# Patient Record
Sex: MALE | Race: WHITE | Employment: FULL TIME | URBAN - METROPOLITAN AREA
[De-identification: names, ages, dates, MRNs, and addresses within clinical notes are randomized per-mention and may not be internally consistent; named-entity substitution may affect disease eponyms.]

---

## 2017-02-16 ENCOUNTER — APPOINTMENT (OUTPATIENT)
Dept: LAB | Age: 42
End: 2017-02-16
Attending: FAMILY MEDICINE
Payer: COMMERCIAL

## 2017-02-16 DIAGNOSIS — E78.00 HIGH CHOLESTEROL: ICD-10-CM

## 2017-02-16 DIAGNOSIS — IMO0001 TYPE II OR UNSPECIFIED TYPE DIABETES MELLITUS WITHOUT MENTION OF COMPLICATION, UNCONTROLLED: ICD-10-CM

## 2017-02-16 LAB
ALBUMIN SERPL-MCNC: 3.8 G/DL (ref 3.5–4.8)
ALP LIVER SERPL-CCNC: 115 U/L (ref 45–117)
ALT SERPL-CCNC: 51 U/L (ref 17–63)
AST SERPL-CCNC: 21 U/L (ref 15–41)
BILIRUB SERPL-MCNC: 1.4 MG/DL (ref 0.1–2)
BUN BLD-MCNC: 11 MG/DL (ref 8–20)
CALCIUM BLD-MCNC: 8.8 MG/DL (ref 8.3–10.3)
CHLORIDE: 103 MMOL/L (ref 101–111)
CHOLEST SMN-MCNC: 187 MG/DL (ref ?–200)
CO2: 26 MMOL/L (ref 22–32)
CREAT BLD-MCNC: 0.97 MG/DL (ref 0.7–1.3)
EST. AVERAGE GLUCOSE BLD GHB EST-MCNC: 154 MG/DL (ref 68–126)
GLUCOSE BLD-MCNC: 142 MG/DL (ref 70–99)
HBA1C MFR BLD HPLC: 7 % (ref ?–5.7)
HDLC SERPL-MCNC: 45 MG/DL (ref 45–?)
HDLC SERPL: 4.16 {RATIO} (ref ?–4.97)
LDLC SERPL CALC-MCNC: 119 MG/DL (ref ?–130)
M PROTEIN MFR SERPL ELPH: 7.5 G/DL (ref 6.1–8.3)
NONHDLC SERPL-MCNC: 142 MG/DL (ref ?–130)
POTASSIUM SERPL-SCNC: 4 MMOL/L (ref 3.6–5.1)
SODIUM SERPL-SCNC: 138 MMOL/L (ref 136–144)
TRIGLYCERIDES: 117 MG/DL (ref ?–150)
VLDL: 23 MG/DL (ref 5–40)

## 2017-02-16 PROCEDURE — 80053 COMPREHEN METABOLIC PANEL: CPT

## 2017-02-16 PROCEDURE — 36415 COLL VENOUS BLD VENIPUNCTURE: CPT

## 2017-02-16 PROCEDURE — 83036 HEMOGLOBIN GLYCOSYLATED A1C: CPT

## 2017-02-16 PROCEDURE — 80061 LIPID PANEL: CPT

## 2017-02-20 ENCOUNTER — TELEPHONE (OUTPATIENT)
Dept: FAMILY MEDICINE CLINIC | Facility: CLINIC | Age: 42
End: 2017-02-20

## 2017-02-21 NOTE — TELEPHONE ENCOUNTER
Please call patient.  He needs to schedule a follow up appt with dr. Awilda Cornelius regarding lab results  Thanks

## 2017-02-24 ENCOUNTER — OFFICE VISIT (OUTPATIENT)
Dept: FAMILY MEDICINE CLINIC | Facility: CLINIC | Age: 42
End: 2017-02-24

## 2017-02-24 VITALS
BODY MASS INDEX: 42.66 KG/M2 | HEIGHT: 72 IN | SYSTOLIC BLOOD PRESSURE: 120 MMHG | RESPIRATION RATE: 12 BRPM | OXYGEN SATURATION: 99 % | WEIGHT: 315 LBS | TEMPERATURE: 98 F | DIASTOLIC BLOOD PRESSURE: 78 MMHG | HEART RATE: 89 BPM

## 2017-02-24 DIAGNOSIS — E66.01 MORBID OBESITY DUE TO EXCESS CALORIES (HCC): ICD-10-CM

## 2017-02-24 DIAGNOSIS — E11.9 TYPE 2 DIABETES MELLITUS WITHOUT COMPLICATION, WITHOUT LONG-TERM CURRENT USE OF INSULIN (HCC): ICD-10-CM

## 2017-02-24 DIAGNOSIS — Z23 NEED FOR VACCINATION: Primary | ICD-10-CM

## 2017-02-24 PROCEDURE — 90686 IIV4 VACC NO PRSV 0.5 ML IM: CPT | Performed by: FAMILY MEDICINE

## 2017-02-24 PROCEDURE — 99214 OFFICE O/P EST MOD 30 MIN: CPT | Performed by: FAMILY MEDICINE

## 2017-02-24 PROCEDURE — 90471 IMMUNIZATION ADMIN: CPT | Performed by: FAMILY MEDICINE

## 2017-02-24 RX ORDER — METFORMIN HYDROCHLORIDE 500 MG/1
500 TABLET, EXTENDED RELEASE ORAL DAILY
Qty: 90 TABLET | Refills: 1 | Status: SHIPPED | OUTPATIENT
Start: 2017-02-24 | End: 2017-10-16

## 2017-02-24 NOTE — PROGRESS NOTES
HPI:    Patient ID: Marquita De La Rosa is a 43year old male. HPI  Patient is here for follow-up of labs. He feels fine and has no complaints today.   Review of Systems  Negative except for the above         Current Outpatient Prescriptions:  MetFORMIN HC cpx labs at that time. Flu shot today per pt request. Risk/benefit discussed.        Orders Placed This Encounter  INFLUENZA VIRUS VACCINE, QUAD, PRESERVATIVE FREE, 0.5 ML    Meds This Visit:  Signed Prescriptions Disp Refills    MetFORMIN HCl  MG

## 2017-02-28 PROBLEM — E11.9 TYPE 2 DIABETES MELLITUS WITHOUT COMPLICATION, WITHOUT LONG-TERM CURRENT USE OF INSULIN (HCC): Status: ACTIVE | Noted: 2017-02-28

## 2017-05-04 DIAGNOSIS — E11.9 TYPE 2 DIABETES MELLITUS WITHOUT COMPLICATION, WITHOUT LONG-TERM CURRENT USE OF INSULIN (HCC): Primary | ICD-10-CM

## 2017-05-24 ENCOUNTER — PATIENT OUTREACH (OUTPATIENT)
Dept: FAMILY MEDICINE CLINIC | Facility: CLINIC | Age: 42
End: 2017-05-24

## 2017-05-25 ENCOUNTER — APPOINTMENT (OUTPATIENT)
Dept: LAB | Age: 42
End: 2017-05-25
Attending: FAMILY MEDICINE
Payer: COMMERCIAL

## 2017-05-25 DIAGNOSIS — E11.9 TYPE 2 DIABETES MELLITUS WITHOUT COMPLICATION, WITHOUT LONG-TERM CURRENT USE OF INSULIN (HCC): ICD-10-CM

## 2017-05-25 PROCEDURE — 36415 COLL VENOUS BLD VENIPUNCTURE: CPT | Performed by: FAMILY MEDICINE

## 2017-06-05 ENCOUNTER — OFFICE VISIT (OUTPATIENT)
Dept: FAMILY MEDICINE CLINIC | Facility: CLINIC | Age: 42
End: 2017-06-05

## 2017-06-05 VITALS
RESPIRATION RATE: 12 BRPM | HEIGHT: 72 IN | WEIGHT: 315 LBS | TEMPERATURE: 98 F | DIASTOLIC BLOOD PRESSURE: 68 MMHG | BODY MASS INDEX: 42.66 KG/M2 | OXYGEN SATURATION: 98 % | HEART RATE: 74 BPM | SYSTOLIC BLOOD PRESSURE: 122 MMHG

## 2017-06-05 DIAGNOSIS — E66.01 MORBID OBESITY DUE TO EXCESS CALORIES (HCC): ICD-10-CM

## 2017-06-05 DIAGNOSIS — E11.9 TYPE 2 DIABETES MELLITUS WITHOUT COMPLICATION, WITHOUT LONG-TERM CURRENT USE OF INSULIN (HCC): Primary | ICD-10-CM

## 2017-06-05 DIAGNOSIS — R80.9 PROTEINURIA, UNSPECIFIED TYPE: ICD-10-CM

## 2017-06-05 PROCEDURE — 99214 OFFICE O/P EST MOD 30 MIN: CPT | Performed by: FAMILY MEDICINE

## 2017-06-05 NOTE — PROGRESS NOTES
HPI:    Patient ID: Alyson Patel is a 43year old male. HPI  Patient is here for follow-up of diabetes, morbid obesity. He is feeling fine and has no specific complaints. No problem urinating.   Review of Systems  Negative except for the above monitor blood sugar and goals reassessed with hemoglobin A1c goal is 7. or less. Urine microalbumin is high at 149. Previously in June 2016 was 4.4.   Patient will have tests repeated to confirm and was told that he should start ACE inhibitor but would li

## 2017-06-21 ENCOUNTER — TELEPHONE (OUTPATIENT)
Dept: FAMILY MEDICINE CLINIC | Facility: CLINIC | Age: 42
End: 2017-06-21

## 2017-06-21 DIAGNOSIS — G47.33 OBSTRUCTIVE SLEEP APNEA: Primary | ICD-10-CM

## 2017-06-21 NOTE — TELEPHONE ENCOUNTER
Spoke to pt, he would like referral mailed to his house as he doesn't need to see the pulmonologist until the fall.

## 2017-06-21 NOTE — TELEPHONE ENCOUNTER
Please call patient with new referral information regarding pulmonary doctor. Previous doctor is out of network. Pt needs to see someone in his network. Referral placed for Dr. Johnathan Yañez. Please give pt information.

## 2017-07-05 NOTE — ADDENDUM NOTE
Encounter addended by: Thierry Baptiste on: 7/5/2017 12:53 PM<BR>    Actions taken: Letter status changed

## 2017-10-10 ENCOUNTER — APPOINTMENT (OUTPATIENT)
Dept: LAB | Age: 42
End: 2017-10-10
Attending: FAMILY MEDICINE
Payer: COMMERCIAL

## 2017-10-10 DIAGNOSIS — E11.9 TYPE 2 DIABETES MELLITUS WITHOUT COMPLICATION, WITHOUT LONG-TERM CURRENT USE OF INSULIN (HCC): ICD-10-CM

## 2017-10-10 PROCEDURE — 80061 LIPID PANEL: CPT

## 2017-10-10 PROCEDURE — 80053 COMPREHEN METABOLIC PANEL: CPT

## 2017-10-10 PROCEDURE — 82043 UR ALBUMIN QUANTITATIVE: CPT

## 2017-10-10 PROCEDURE — 36415 COLL VENOUS BLD VENIPUNCTURE: CPT

## 2017-10-10 PROCEDURE — 82570 ASSAY OF URINE CREATININE: CPT

## 2017-10-10 PROCEDURE — 83036 HEMOGLOBIN GLYCOSYLATED A1C: CPT

## 2017-10-16 ENCOUNTER — OFFICE VISIT (OUTPATIENT)
Dept: FAMILY MEDICINE CLINIC | Facility: CLINIC | Age: 42
End: 2017-10-16

## 2017-10-16 VITALS
HEART RATE: 72 BPM | WEIGHT: 315 LBS | DIASTOLIC BLOOD PRESSURE: 88 MMHG | TEMPERATURE: 98 F | SYSTOLIC BLOOD PRESSURE: 126 MMHG | BODY MASS INDEX: 42.66 KG/M2 | HEIGHT: 72 IN

## 2017-10-16 DIAGNOSIS — E66.01 MORBID OBESITY (HCC): ICD-10-CM

## 2017-10-16 DIAGNOSIS — E11.9 TYPE 2 DIABETES MELLITUS WITHOUT COMPLICATION, WITHOUT LONG-TERM CURRENT USE OF INSULIN (HCC): Primary | ICD-10-CM

## 2017-10-16 PROCEDURE — 99214 OFFICE O/P EST MOD 30 MIN: CPT | Performed by: FAMILY MEDICINE

## 2017-10-16 RX ORDER — METFORMIN HYDROCHLORIDE 500 MG/1
500 TABLET, EXTENDED RELEASE ORAL 2 TIMES DAILY WITH MEALS
Qty: 180 TABLET | Refills: 1 | Status: SHIPPED | OUTPATIENT
Start: 2017-10-16 | End: 2018-05-31

## 2017-10-16 NOTE — PROGRESS NOTES
HPI:    Patient ID: Denis Riojas is a 43year old male. HPI  Patient is here for follow-up of labs. Follow-up of diabetes and obesity.   Review of Systems  Negative except for the above       Current Outpatient Prescriptions:  MetFORMIN HCl  M proper eating habits along with exercise to help lose weight. Risks of excessive weight discussed as well including high blood pressure, joint problems, and diabetes. Pt understands. No orders of the defined types were placed in this encounter.       Me

## 2018-05-31 RX ORDER — METFORMIN HYDROCHLORIDE 500 MG/1
500 TABLET, EXTENDED RELEASE ORAL 2 TIMES DAILY WITH MEALS
Qty: 60 TABLET | Refills: 0 | Status: SHIPPED | OUTPATIENT
Start: 2018-05-31 | End: 2018-07-11

## 2018-05-31 NOTE — TELEPHONE ENCOUNTER
Requesting   METFORMIN HCL ER 500MG TB24  Diabetic Medication Protocol Failed   HgBA1C procedure resulted in past 6 months    Last HgBA1C < 7.5    Appointment in past 6 or next 3 months     LOV: 10/16/17 Dr Norma Benavidez  RTC: 1 mos  Last Labs: 10/10/17  Filled:

## 2018-07-11 ENCOUNTER — TELEPHONE (OUTPATIENT)
Dept: FAMILY MEDICINE CLINIC | Facility: CLINIC | Age: 43
End: 2018-07-11

## 2018-07-11 RX ORDER — METFORMIN HYDROCHLORIDE 500 MG/1
500 TABLET, EXTENDED RELEASE ORAL 2 TIMES DAILY WITH MEALS
Qty: 30 TABLET | Refills: 0 | Status: SHIPPED | OUTPATIENT
Start: 2018-07-11 | End: 2018-07-17

## 2018-07-11 NOTE — TELEPHONE ENCOUNTER
Future Appointments  Date Time Provider Octavio Mcnulty   7/17/2018 3:15 PM Beck Craig MD [facility] [address] [facility] [address]     PT is completely out of his metformin. Is there any way for him to get a refill enough to last him until Tuesday, 7/17.     PAUL MONTANO

## 2018-07-17 ENCOUNTER — OFFICE VISIT (OUTPATIENT)
Dept: FAMILY MEDICINE CLINIC | Facility: CLINIC | Age: 43
End: 2018-07-17

## 2018-07-17 VITALS
HEART RATE: 95 BPM | SYSTOLIC BLOOD PRESSURE: 154 MMHG | HEIGHT: 72 IN | OXYGEN SATURATION: 100 % | BODY MASS INDEX: 42.66 KG/M2 | DIASTOLIC BLOOD PRESSURE: 90 MMHG | RESPIRATION RATE: 12 BRPM | TEMPERATURE: 98 F | WEIGHT: 315 LBS

## 2018-07-17 DIAGNOSIS — E11.9 TYPE 2 DIABETES MELLITUS WITHOUT COMPLICATION, WITHOUT LONG-TERM CURRENT USE OF INSULIN (HCC): Primary | ICD-10-CM

## 2018-07-17 DIAGNOSIS — Z13.31 NEGATIVE DEPRESSION SCREENING: ICD-10-CM

## 2018-07-17 PROCEDURE — 99213 OFFICE O/P EST LOW 20 MIN: CPT | Performed by: FAMILY MEDICINE

## 2018-07-17 RX ORDER — METFORMIN HYDROCHLORIDE 500 MG/1
500 TABLET, EXTENDED RELEASE ORAL 2 TIMES DAILY WITH MEALS
Qty: 180 TABLET | Refills: 2 | Status: SHIPPED | OUTPATIENT
Start: 2018-07-17 | End: 2018-09-27 | Stop reason: DRUGHIGH

## 2018-07-17 RX ORDER — LISINOPRIL 10 MG/1
10 TABLET ORAL DAILY
Qty: 90 TABLET | Refills: 0 | Status: SHIPPED | OUTPATIENT
Start: 2018-07-17 | End: 2019-04-22

## 2018-07-17 NOTE — PROGRESS NOTES
HPI:    Patient ID: Author Cruz is a 37year old male. HPI  Patient is here for follow-up of diabetes. He feels well and has no complaints. He has no insurance right now but plans on getting it in January 2019.   Review of Systems  Negative except

## 2018-09-27 ENCOUNTER — TELEPHONE (OUTPATIENT)
Dept: FAMILY MEDICINE CLINIC | Facility: CLINIC | Age: 43
End: 2018-09-27

## 2018-09-27 NOTE — TELEPHONE ENCOUNTER
hga1c is high at 9.6. Increase metformin to  1000mg bid. Monitor blood sugars morning fasting and before supper. Send me log in 3 weeks.      Can send new rx for metformin 1000mg bid , #180, 1 RF

## 2018-09-27 NOTE — TELEPHONE ENCOUNTER
Patient informed of his result. He will keep log of his blood sugar on new dose of medication. RX sent to pharmacy. Patient will call with any problems.

## 2019-04-22 ENCOUNTER — OFFICE VISIT (OUTPATIENT)
Dept: FAMILY MEDICINE CLINIC | Facility: CLINIC | Age: 44
End: 2019-04-22

## 2019-04-22 VITALS
TEMPERATURE: 98 F | RESPIRATION RATE: 14 BRPM | WEIGHT: 315 LBS | HEART RATE: 103 BPM | SYSTOLIC BLOOD PRESSURE: 144 MMHG | HEIGHT: 72 IN | DIASTOLIC BLOOD PRESSURE: 100 MMHG | BODY MASS INDEX: 42.66 KG/M2 | OXYGEN SATURATION: 99 %

## 2019-04-22 DIAGNOSIS — E11.9 TYPE 2 DIABETES MELLITUS WITHOUT COMPLICATION, WITHOUT LONG-TERM CURRENT USE OF INSULIN (HCC): ICD-10-CM

## 2019-04-22 DIAGNOSIS — Z00.00 ROUTINE GENERAL MEDICAL EXAMINATION AT A HEALTH CARE FACILITY: Primary | ICD-10-CM

## 2019-04-22 DIAGNOSIS — E66.01 MORBID OBESITY (HCC): ICD-10-CM

## 2019-04-22 PROCEDURE — 90732 PPSV23 VACC 2 YRS+ SUBQ/IM: CPT | Performed by: FAMILY MEDICINE

## 2019-04-22 PROCEDURE — 99213 OFFICE O/P EST LOW 20 MIN: CPT | Performed by: FAMILY MEDICINE

## 2019-04-22 PROCEDURE — 99396 PREV VISIT EST AGE 40-64: CPT | Performed by: FAMILY MEDICINE

## 2019-04-22 PROCEDURE — 90471 IMMUNIZATION ADMIN: CPT | Performed by: FAMILY MEDICINE

## 2019-04-22 RX ORDER — IRBESARTAN 150 MG/1
150 TABLET ORAL EVERY MORNING
Qty: 30 TABLET | Refills: 0 | Status: SHIPPED | OUTPATIENT
Start: 2019-04-22

## 2019-04-22 NOTE — PROGRESS NOTES
Sarah Thompson is a 40year old male who presents for a complete physical exam.   HPI:   Pt complains of nothing.   Urination changes no  ED symptoms no  Immunizations needed no  Wt Readings from Last 6 Encounters:  04/22/19 : (!) 331 lb  07/17/18 : (!) 3 Use      Smoking status: Never Smoker      Smokeless tobacco: Never Used    Alcohol use:  Yes      Alcohol/week: 0.0 oz      Comment: social    Drug use: No        REVIEW OF SYSTEMS:   GENERAL: feels well overall, denies fever or chills, denies change in we swelling, extremities x 4 with normal strength 5/5 and symmetric and with normal AROM/PROM. EXTREMITIES: no cyanosis, clubbing or edema  NEURO: A&O x3, CN II-XII grossly intact. Reflexes: biceps and patellar 2+ b/l and symmetric.  Gait is normal.  PSYCH: vegetables, fresh fruits and lean meats. Aerobic exercise 30 minutes five days a week for cardiovascular fitness and 45-60 minutes 6-7 days a week for weight loss. Advised testicular self exam once a month.     Above age 28-36, patient was told to have a

## 2019-06-19 ENCOUNTER — APPOINTMENT (OUTPATIENT)
Dept: LAB | Age: 44
End: 2019-06-19
Attending: FAMILY MEDICINE
Payer: COMMERCIAL

## 2019-06-19 DIAGNOSIS — Z00.00 ROUTINE GENERAL MEDICAL EXAMINATION AT A HEALTH CARE FACILITY: ICD-10-CM

## 2019-06-19 DIAGNOSIS — E11.9 TYPE 2 DIABETES MELLITUS WITHOUT COMPLICATION, WITHOUT LONG-TERM CURRENT USE OF INSULIN (HCC): ICD-10-CM

## 2019-06-19 PROCEDURE — 85027 COMPLETE CBC AUTOMATED: CPT

## 2019-06-19 PROCEDURE — 84443 ASSAY THYROID STIM HORMONE: CPT

## 2019-06-19 PROCEDURE — 84439 ASSAY OF FREE THYROXINE: CPT

## 2019-06-19 PROCEDURE — 80061 LIPID PANEL: CPT

## 2019-06-19 PROCEDURE — 82570 ASSAY OF URINE CREATININE: CPT

## 2019-06-19 PROCEDURE — 80053 COMPREHEN METABOLIC PANEL: CPT

## 2019-06-19 PROCEDURE — 36415 COLL VENOUS BLD VENIPUNCTURE: CPT

## 2019-06-19 PROCEDURE — 82043 UR ALBUMIN QUANTITATIVE: CPT

## 2019-06-19 PROCEDURE — 83036 HEMOGLOBIN GLYCOSYLATED A1C: CPT

## 2020-02-03 ENCOUNTER — TELEPHONE (OUTPATIENT)
Dept: FAMILY MEDICINE CLINIC | Facility: CLINIC | Age: 45
End: 2020-02-03

## 2020-02-03 DIAGNOSIS — E11.9 TYPE 2 DIABETES MELLITUS WITHOUT COMPLICATION, WITHOUT LONG-TERM CURRENT USE OF INSULIN (HCC): Primary | ICD-10-CM

## 2020-11-02 ENCOUNTER — TELEMEDICINE (OUTPATIENT)
Dept: FAMILY MEDICINE CLINIC | Facility: CLINIC | Age: 45
End: 2020-11-02

## 2020-11-02 DIAGNOSIS — E11.9 TYPE 2 DIABETES MELLITUS WITHOUT COMPLICATION, WITHOUT LONG-TERM CURRENT USE OF INSULIN (HCC): ICD-10-CM

## 2020-11-02 DIAGNOSIS — Z00.00 ROUTINE ADULT HEALTH MAINTENANCE: Primary | ICD-10-CM

## 2020-11-02 DIAGNOSIS — Z20.822 EXPOSURE TO COVID-19 VIRUS: ICD-10-CM

## 2020-11-02 PROCEDURE — 99213 OFFICE O/P EST LOW 20 MIN: CPT | Performed by: FAMILY MEDICINE

## 2020-11-02 NOTE — PROGRESS NOTES
TELEMEDICINE VISIT by phone  This visit is conducted using Telemedicine with live, interactive video.     Verification of patient identity was established by the  patient (s)  Denis Riojas verbally consents to a telemedic telemedicine visit  Physical Exam:  GEN:  Patient is alert, awake and oriented, no apparent distress. ASSESSMENT AND PLAN:      1. Routine adult health maintenance  - CBC, PLATELET; NO DIFFERENTIAL; Future  - COMP METABOLIC PANEL (14);  Future  - HEMOG

## 2020-11-03 ENCOUNTER — APPOINTMENT (OUTPATIENT)
Dept: LAB | Age: 45
End: 2020-11-03
Attending: FAMILY MEDICINE
Payer: COMMERCIAL

## 2020-11-03 DIAGNOSIS — Z20.822 EXPOSURE TO COVID-19 VIRUS: ICD-10-CM

## 2021-04-09 DIAGNOSIS — Z23 NEED FOR VACCINATION: ICD-10-CM

## 2023-03-21 ENCOUNTER — TELEPHONE (OUTPATIENT)
Dept: FAMILY MEDICINE CLINIC | Facility: CLINIC | Age: 48
End: 2023-03-21

## 2023-04-12 ENCOUNTER — OFFICE VISIT (OUTPATIENT)
Dept: FAMILY MEDICINE CLINIC | Facility: CLINIC | Age: 48
End: 2023-04-12
Payer: COMMERCIAL

## 2023-04-12 VITALS
DIASTOLIC BLOOD PRESSURE: 88 MMHG | TEMPERATURE: 97 F | HEART RATE: 99 BPM | HEIGHT: 72 IN | RESPIRATION RATE: 16 BRPM | WEIGHT: 269.38 LBS | OXYGEN SATURATION: 100 % | SYSTOLIC BLOOD PRESSURE: 124 MMHG | BODY MASS INDEX: 36.49 KG/M2

## 2023-04-12 DIAGNOSIS — E11.9 TYPE 2 DIABETES MELLITUS WITHOUT COMPLICATION, WITHOUT LONG-TERM CURRENT USE OF INSULIN (HCC): ICD-10-CM

## 2023-04-12 DIAGNOSIS — Z12.11 COLON CANCER SCREENING: ICD-10-CM

## 2023-04-12 DIAGNOSIS — Z00.00 ROUTINE ADULT HEALTH MAINTENANCE: Primary | ICD-10-CM

## 2023-04-12 LAB
CARTRIDGE LOT#: 573 NUMERIC
HEMOGLOBIN A1C: 11.9 % (ref 4.3–5.6)

## 2023-04-12 PROCEDURE — 3079F DIAST BP 80-89 MM HG: CPT | Performed by: FAMILY MEDICINE

## 2023-04-12 PROCEDURE — 3074F SYST BP LT 130 MM HG: CPT | Performed by: FAMILY MEDICINE

## 2023-04-12 PROCEDURE — 3046F HEMOGLOBIN A1C LEVEL >9.0%: CPT | Performed by: FAMILY MEDICINE

## 2023-04-12 PROCEDURE — G0438 PPPS, INITIAL VISIT: HCPCS | Performed by: FAMILY MEDICINE

## 2023-04-12 PROCEDURE — 83036 HEMOGLOBIN GLYCOSYLATED A1C: CPT | Performed by: FAMILY MEDICINE

## 2023-04-12 PROCEDURE — 99396 PREV VISIT EST AGE 40-64: CPT | Performed by: FAMILY MEDICINE

## 2023-04-12 PROCEDURE — 3008F BODY MASS INDEX DOCD: CPT | Performed by: FAMILY MEDICINE

## 2023-04-12 RX ORDER — GLIPIZIDE 5 MG/1
5 TABLET, FILM COATED, EXTENDED RELEASE ORAL EVERY MORNING
Qty: 30 TABLET | Refills: 0 | Status: SHIPPED | OUTPATIENT
Start: 2023-04-12

## 2023-04-20 ENCOUNTER — LAB ENCOUNTER (OUTPATIENT)
Dept: LAB | Age: 48
End: 2023-04-20
Attending: FAMILY MEDICINE
Payer: COMMERCIAL

## 2023-04-20 DIAGNOSIS — E78.00 HIGH CHOLESTEROL: Primary | ICD-10-CM

## 2023-04-20 DIAGNOSIS — E11.9 TYPE 2 DIABETES MELLITUS WITHOUT COMPLICATION, WITHOUT LONG-TERM CURRENT USE OF INSULIN (HCC): ICD-10-CM

## 2023-04-20 LAB
ALBUMIN SERPL-MCNC: 3.9 G/DL (ref 3.4–5)
ALBUMIN/GLOB SERPL: 1 {RATIO} (ref 1–2)
ALP LIVER SERPL-CCNC: 138 U/L
ALT SERPL-CCNC: 34 U/L
ANION GAP SERPL CALC-SCNC: 7 MMOL/L (ref 0–18)
AST SERPL-CCNC: 17 U/L (ref 15–37)
BASOPHILS # BLD AUTO: 0.04 X10(3) UL (ref 0–0.2)
BASOPHILS NFR BLD AUTO: 0.7 %
BILIRUB SERPL-MCNC: 0.9 MG/DL (ref 0.1–2)
BUN BLD-MCNC: 12 MG/DL (ref 7–18)
CALCIUM BLD-MCNC: 8.9 MG/DL (ref 8.5–10.1)
CHLORIDE SERPL-SCNC: 102 MMOL/L (ref 98–112)
CHOLEST SERPL-MCNC: 229 MG/DL (ref ?–200)
CO2 SERPL-SCNC: 27 MMOL/L (ref 21–32)
CREAT BLD-MCNC: 1.12 MG/DL
CREAT UR-SCNC: 143 MG/DL
EOSINOPHIL # BLD AUTO: 0.17 X10(3) UL (ref 0–0.7)
EOSINOPHIL NFR BLD AUTO: 2.8 %
ERYTHROCYTE [DISTWIDTH] IN BLOOD BY AUTOMATED COUNT: 12.9 %
FASTING PATIENT LIPID ANSWER: YES
FASTING STATUS PATIENT QL REPORTED: YES
GFR SERPLBLD BASED ON 1.73 SQ M-ARVRAT: 81 ML/MIN/1.73M2 (ref 60–?)
GLOBULIN PLAS-MCNC: 3.9 G/DL (ref 2.8–4.4)
GLUCOSE BLD-MCNC: 283 MG/DL (ref 70–99)
HCT VFR BLD AUTO: 48.5 %
HDLC SERPL-MCNC: 41 MG/DL (ref 40–59)
HGB BLD-MCNC: 16.8 G/DL
IMM GRANULOCYTES # BLD AUTO: 0.02 X10(3) UL (ref 0–1)
IMM GRANULOCYTES NFR BLD: 0.3 %
LDLC SERPL CALC-MCNC: 157 MG/DL (ref ?–100)
LYMPHOCYTES # BLD AUTO: 2.07 X10(3) UL (ref 1–4)
LYMPHOCYTES NFR BLD AUTO: 34.3 %
MCH RBC QN AUTO: 27.9 PG (ref 26–34)
MCHC RBC AUTO-ENTMCNC: 34.6 G/DL (ref 31–37)
MCV RBC AUTO: 80.4 FL
MICROALBUMIN UR-MCNC: 1.29 MG/DL
MICROALBUMIN/CREAT 24H UR-RTO: 9 UG/MG (ref ?–30)
MONOCYTES # BLD AUTO: 0.69 X10(3) UL (ref 0.1–1)
MONOCYTES NFR BLD AUTO: 11.4 %
NEUTROPHILS # BLD AUTO: 3.05 X10 (3) UL (ref 1.5–7.7)
NEUTROPHILS # BLD AUTO: 3.05 X10(3) UL (ref 1.5–7.7)
NEUTROPHILS NFR BLD AUTO: 50.5 %
NONHDLC SERPL-MCNC: 188 MG/DL (ref ?–130)
OSMOLALITY SERPL CALC.SUM OF ELEC: 292 MOSM/KG (ref 275–295)
PLATELET # BLD AUTO: 197 10(3)UL (ref 150–450)
POTASSIUM SERPL-SCNC: 3.9 MMOL/L (ref 3.5–5.1)
PROT SERPL-MCNC: 7.8 G/DL (ref 6.4–8.2)
RBC # BLD AUTO: 6.03 X10(6)UL
SODIUM SERPL-SCNC: 136 MMOL/L (ref 136–145)
T4 FREE SERPL-MCNC: 0.9 NG/DL (ref 0.8–1.7)
TRIGL SERPL-MCNC: 172 MG/DL (ref 30–149)
TSI SER-ACNC: 3.63 MIU/ML (ref 0.36–3.74)
VLDLC SERPL CALC-MCNC: 33 MG/DL (ref 0–30)
WBC # BLD AUTO: 6 X10(3) UL (ref 4–11)

## 2023-04-20 PROCEDURE — 80061 LIPID PANEL: CPT

## 2023-04-20 PROCEDURE — 85025 COMPLETE CBC W/AUTO DIFF WBC: CPT

## 2023-04-20 PROCEDURE — 82043 UR ALBUMIN QUANTITATIVE: CPT

## 2023-04-20 PROCEDURE — 36415 COLL VENOUS BLD VENIPUNCTURE: CPT

## 2023-04-20 PROCEDURE — 80053 COMPREHEN METABOLIC PANEL: CPT

## 2023-04-20 PROCEDURE — 84439 ASSAY OF FREE THYROXINE: CPT

## 2023-04-20 PROCEDURE — 82570 ASSAY OF URINE CREATININE: CPT

## 2023-04-20 PROCEDURE — 84443 ASSAY THYROID STIM HORMONE: CPT

## 2023-04-25 DIAGNOSIS — E11.9 TYPE 2 DIABETES MELLITUS WITHOUT COMPLICATION, WITHOUT LONG-TERM CURRENT USE OF INSULIN (HCC): Primary | ICD-10-CM

## 2023-04-25 DIAGNOSIS — E78.5 HYPERLIPIDEMIA, UNSPECIFIED HYPERLIPIDEMIA TYPE: ICD-10-CM

## 2023-04-25 RX ORDER — ATORVASTATIN CALCIUM 10 MG/1
10 TABLET, FILM COATED ORAL NIGHTLY
Qty: 90 TABLET | Refills: 0 | Status: SHIPPED | OUTPATIENT
Start: 2023-04-25 | End: 2023-07-24

## 2023-05-10 DIAGNOSIS — E11.9 TYPE 2 DIABETES MELLITUS WITHOUT COMPLICATION, WITHOUT LONG-TERM CURRENT USE OF INSULIN (HCC): ICD-10-CM

## 2023-05-11 RX ORDER — GLIPIZIDE 5 MG/1
TABLET, FILM COATED, EXTENDED RELEASE ORAL
Qty: 30 TABLET | Refills: 0 | Status: SHIPPED | OUTPATIENT
Start: 2023-05-11

## 2023-06-08 DIAGNOSIS — E11.9 TYPE 2 DIABETES MELLITUS WITHOUT COMPLICATION, WITHOUT LONG-TERM CURRENT USE OF INSULIN (HCC): ICD-10-CM

## 2023-06-12 NOTE — TELEPHONE ENCOUNTER
glipiZIDE ER 5 MG Oral Tablet 24 Hr          Sig: Take 1 tablet (5 mg total) by mouth every morning. Disp: 30 tablet    Refills: 0    Start: 6/8/2023    Class: Normal    Non-formulary For: Type 2 diabetes mellitus without complication, without long-term current use of insulin ([facility] [address]    To pharmacy: NO FURTHER REFILLS,NEEDS TO SEE DIABETIC CENTER    Last ordered: 1 month ago by Jcarlos Choudhury MD     Patient comment: Diabetic appointment is scheduled for Sept 18.    I keep checking for an earlier availability, but have been unlucky so far    Diabetic Medication Protocol [id number]/08/2023 11:58 AM   Protocol Details Last HgBA1C < 7.5    HgBA1C procedure resulted in past 6 months    Microalbumin procedure in past 12 months or taking ACE/ARB    Appointment in past 6 or next 3 months      To be filled at: [address] [place] [address] [phone], [phone]     Please see pts above comment

## 2023-06-13 RX ORDER — GLIPIZIDE 5 MG/1
5 TABLET, FILM COATED, EXTENDED RELEASE ORAL EVERY MORNING
Qty: 30 TABLET | Refills: 0 | Status: SHIPPED | OUTPATIENT
Start: 2023-06-13

## 2023-07-05 DIAGNOSIS — E11.9 TYPE 2 DIABETES MELLITUS WITHOUT COMPLICATION, WITHOUT LONG-TERM CURRENT USE OF INSULIN (HCC): ICD-10-CM

## 2023-07-05 RX ORDER — GLIPIZIDE 5 MG/1
5 TABLET, FILM COATED, EXTENDED RELEASE ORAL EVERY MORNING
Qty: 30 TABLET | Refills: 0 | Status: SHIPPED | OUTPATIENT
Start: 2023-07-05

## 2023-07-05 NOTE — TELEPHONE ENCOUNTER
glipiZIDE ER 5 MG Oral Tablet 24 Hr          Sig: Take 1 tablet (5 mg total) by mouth every morning. Disp: 30 tablet    Refills: 0    Start: 7/5/2023    Class: Normal    Non-formulary For: Type 2 diabetes mellitus without complication, without long-term current use of insulin ([address]    To pharmacy: NO FURTHER REFILLS,NEEDS TO SEE DIABETIC CENTER    Last ordered: 3 weeks ago by Ambika Lo MD     Patient comment: Diabetic appointment was moved up to July 24, but i am down to 8 pills. I will be out of town July 7-16, so i need to refill this july 6.       Diabetic Medication Protocol Kuzyaw9807/05/2023 08:46 AM   Protocol Details Last HgBA1C < 7.5    HgBA1C procedure resulted in past 6 months    Microalbumin procedure in past 12 months or taking ACE/ARB    Appointment in past 6 or next 3 months      To be filled at: [address] [place] [address] [phone], [phone]     Future Appointments   Date Time Provider Octavio Mcnulty   7/24/2023  8:15 AM GISELE Robert [facility] [address] [address]     LOV: cpx: 4/12/23  Last r/f: 6/13/23 # 30 0 r/fs  Labs: 4/20/23     RTC: none    Please advise

## 2023-07-06 RX ORDER — GLIPIZIDE 5 MG/1
TABLET, FILM COATED, EXTENDED RELEASE ORAL
Qty: 30 TABLET | Refills: 0 | OUTPATIENT
Start: 2023-07-06

## 2023-07-06 NOTE — TELEPHONE ENCOUNTER
Request denied as duplicate - Rx sent 6/0/04    glipiZIDE ER 5 MG Oral Tablet 24 Hr 30 tablet 0 7/5/2023     Sig - Route:  Take 1 tablet (5 mg total) by mouth every morning. - Oral    Sent to pharmacy as: glipiZIDE ER 5 MG Oral Tablet Extended Release 24 Hour (Glucotrol XL)    Notes to Pharmacy: NO FURTHER REFILLS,NEEDS TO SEE DIABETIC CENTER    E-Prescribing Status: Receipt confirmed by pharmacy (7/5/2023  1:19 PM CDT)      Future Appointments   Date Time Provider Octavio Mcnulty   7/24/2023  8:15 AM GISELE Pickens [facility]

## 2023-07-24 ENCOUNTER — TELEPHONE (OUTPATIENT)
Dept: ENDOCRINOLOGY CLINIC | Facility: CLINIC | Age: 48
End: 2023-07-24

## 2023-07-24 ENCOUNTER — OFFICE VISIT (OUTPATIENT)
Dept: ENDOCRINOLOGY CLINIC | Facility: CLINIC | Age: 48
End: 2023-07-24
Payer: COMMERCIAL

## 2023-07-24 VITALS
HEART RATE: 77 BPM | RESPIRATION RATE: 17 BRPM | WEIGHT: 285.63 LBS | BODY MASS INDEX: 39 KG/M2 | SYSTOLIC BLOOD PRESSURE: 132 MMHG | DIASTOLIC BLOOD PRESSURE: 68 MMHG

## 2023-07-24 DIAGNOSIS — E11.65 TYPE 2 DIABETES MELLITUS WITH HYPERGLYCEMIA, WITHOUT LONG-TERM CURRENT USE OF INSULIN (HCC): Primary | ICD-10-CM

## 2023-07-24 DIAGNOSIS — E78.2 MIXED HYPERLIPIDEMIA: ICD-10-CM

## 2023-07-24 DIAGNOSIS — E66.01 MORBID OBESITY (HCC): ICD-10-CM

## 2023-07-24 PROBLEM — R80.9 PROTEINURIA: Status: RESOLVED | Noted: 2017-06-05 | Resolved: 2023-07-24

## 2023-07-24 LAB
CARTRIDGE LOT#: 587 NUMERIC
HEMOGLOBIN A1C: 8.3 % (ref 4.3–5.6)

## 2023-07-24 RX ORDER — METFORMIN HYDROCHLORIDE 500 MG/1
500 TABLET, EXTENDED RELEASE ORAL DAILY
Qty: 90 TABLET | Refills: 0 | Status: SHIPPED | OUTPATIENT
Start: 2023-07-24

## 2023-07-24 RX ORDER — GLIPIZIDE 5 MG/1
5 TABLET, FILM COATED, EXTENDED RELEASE ORAL EVERY MORNING
Qty: 90 TABLET | Refills: 1 | Status: SHIPPED | OUTPATIENT
Start: 2023-07-24

## 2023-07-24 NOTE — TELEPHONE ENCOUNTER
Patient stated he went 3 years ago for Diabetic eye exam contacted Dr Ramirez [facility]. Informed I would just note information since it is old.

## 2023-08-09 RX ORDER — GLIPIZIDE 5 MG/1
5 TABLET, FILM COATED, EXTENDED RELEASE ORAL EVERY MORNING
Qty: 90 TABLET | Refills: 1 | Status: SHIPPED | OUTPATIENT
Start: 2023-08-09

## 2023-08-09 NOTE — TELEPHONE ENCOUNTER
Requested Prescriptions     Pending Prescriptions Disp Refills    glipiZIDE ER 5 MG Oral Tablet 24 Hr 90 tablet 1     Sig: Take 1 tablet (5 mg total) by mouth every morning. Your appointments       Date & Time Appointment Department [facility])    Nov 02, 2023  8:15 AM CDT Video Visit (Follow up) with GISELE Hernandez, [address] ( [facility])    Please verify your telehealth insurance benefits prior to your appointment. You must be in the state of [place] during the virtual visit. Please use the [employer] and launch the video visit 10 minutes prior to your scheduled appointment time to ensure your camera and microphone are working properly. Once the video visit has started you will be placed in a waiting room until the provider begins the visit. You will receive an email confirmation with instructions. If you have questions, call your doctor's office directly. If you are having issues or need to use a desktop/laptop, please follow the below steps:        1. Close out all other open apps (could be competing for audio resources)  2. Disable Bluetooth  3.       Reboot mobile device before joining the video  4. Come off Wi-Fi and switch over to Data    Please see our Video Visit Tip Sheet if you need additional assistance. If you believe this is an emergency, please dial 911 immediately. [facility], [address] [place] [address] Ave [id number]  [phone]          Last A1c value was 8.3% done 7/24/2023.     Refill 7/24/23  LOV 7/24/23

## 2023-10-24 DIAGNOSIS — E11.65 TYPE 2 DIABETES MELLITUS WITH HYPERGLYCEMIA, WITHOUT LONG-TERM CURRENT USE OF INSULIN (HCC): ICD-10-CM

## 2023-10-25 RX ORDER — METFORMIN HYDROCHLORIDE 500 MG/1
500 TABLET, EXTENDED RELEASE ORAL DAILY
Qty: 90 TABLET | Refills: 0 | Status: SHIPPED | OUTPATIENT
Start: 2023-10-25

## 2023-10-25 RX ORDER — METFORMIN HYDROCHLORIDE 500 MG/1
500 TABLET, EXTENDED RELEASE ORAL DAILY
Qty: 90 TABLET | Refills: 0 | Status: SHIPPED | OUTPATIENT
Start: 2023-10-25 | End: 2023-10-25

## 2023-11-06 ENCOUNTER — LAB ENCOUNTER (OUTPATIENT)
Dept: LAB | Age: 48
End: 2023-11-06
Attending: FAMILY MEDICINE
Payer: COMMERCIAL

## 2023-11-06 DIAGNOSIS — E11.65 TYPE 2 DIABETES MELLITUS WITH HYPERGLYCEMIA, WITHOUT LONG-TERM CURRENT USE OF INSULIN (HCC): ICD-10-CM

## 2023-11-06 DIAGNOSIS — E78.00 HIGH CHOLESTEROL: ICD-10-CM

## 2023-11-06 LAB
ALBUMIN SERPL-MCNC: 3.7 G/DL (ref 3.4–5)
ALBUMIN/GLOB SERPL: 1.1 {RATIO} (ref 1–2)
ALP LIVER SERPL-CCNC: 120 U/L
ALT SERPL-CCNC: 27 U/L
ANION GAP SERPL CALC-SCNC: 7 MMOL/L (ref 0–18)
AST SERPL-CCNC: 18 U/L (ref 15–37)
BILIRUB SERPL-MCNC: 0.9 MG/DL (ref 0.1–2)
BUN BLD-MCNC: 8 MG/DL (ref 9–23)
CALCIUM BLD-MCNC: 9.1 MG/DL (ref 8.5–10.1)
CHLORIDE SERPL-SCNC: 105 MMOL/L (ref 98–112)
CHOLEST SERPL-MCNC: 224 MG/DL (ref ?–200)
CO2 SERPL-SCNC: 25 MMOL/L (ref 21–32)
CREAT BLD-MCNC: 1.05 MG/DL
EGFRCR SERPLBLD CKD-EPI 2021: 88 ML/MIN/1.73M2 (ref 60–?)
EST. AVERAGE GLUCOSE BLD GHB EST-MCNC: 206 MG/DL (ref 68–126)
FASTING PATIENT LIPID ANSWER: YES
FASTING STATUS PATIENT QL REPORTED: YES
GLOBULIN PLAS-MCNC: 3.4 G/DL (ref 2.8–4.4)
GLUCOSE BLD-MCNC: 240 MG/DL (ref 70–99)
HBA1C MFR BLD: 8.8 % (ref ?–5.7)
HDLC SERPL-MCNC: 45 MG/DL (ref 40–59)
LDLC SERPL CALC-MCNC: 147 MG/DL (ref ?–100)
NONHDLC SERPL-MCNC: 179 MG/DL (ref ?–130)
OSMOLALITY SERPL CALC.SUM OF ELEC: 290 MOSM/KG (ref 275–295)
POTASSIUM SERPL-SCNC: 3.7 MMOL/L (ref 3.5–5.1)
PROT SERPL-MCNC: 7.1 G/DL (ref 6.4–8.2)
SODIUM SERPL-SCNC: 137 MMOL/L (ref 136–145)
TRIGL SERPL-MCNC: 176 MG/DL (ref 30–149)
VLDLC SERPL CALC-MCNC: 33 MG/DL (ref 0–30)

## 2023-11-06 PROCEDURE — 36415 COLL VENOUS BLD VENIPUNCTURE: CPT

## 2023-11-06 PROCEDURE — 80053 COMPREHEN METABOLIC PANEL: CPT

## 2023-11-06 PROCEDURE — 83036 HEMOGLOBIN GLYCOSYLATED A1C: CPT

## 2023-11-06 PROCEDURE — 80061 LIPID PANEL: CPT

## 2023-11-09 ENCOUNTER — TELEMEDICINE (OUTPATIENT)
Dept: ENDOCRINOLOGY CLINIC | Facility: CLINIC | Age: 48
End: 2023-11-09
Payer: COMMERCIAL

## 2023-11-09 DIAGNOSIS — E11.65 TYPE 2 DIABETES MELLITUS WITH HYPERGLYCEMIA, WITHOUT LONG-TERM CURRENT USE OF INSULIN (HCC): Primary | ICD-10-CM

## 2023-11-09 DIAGNOSIS — E66.01 MORBID OBESITY (HCC): ICD-10-CM

## 2023-11-09 DIAGNOSIS — E78.2 MIXED HYPERLIPIDEMIA: ICD-10-CM

## 2023-11-09 PROCEDURE — 99214 OFFICE O/P EST MOD 30 MIN: CPT | Performed by: NURSE PRACTITIONER

## 2023-11-09 RX ORDER — GLIPIZIDE 2.5 MG/1
2.5 TABLET, EXTENDED RELEASE ORAL
Qty: 90 TABLET | Refills: 1 | Status: SHIPPED | OUTPATIENT
Start: 2023-11-09

## 2023-11-09 RX ORDER — ATORVASTATIN CALCIUM 10 MG/1
10 TABLET, FILM COATED ORAL NIGHTLY
Qty: 90 TABLET | Refills: 1 | Status: SHIPPED | OUTPATIENT
Start: 2023-11-09

## 2023-11-09 RX ORDER — GLIPIZIDE 5 MG/1
5 TABLET, FILM COATED, EXTENDED RELEASE ORAL EVERY MORNING
Qty: 90 TABLET | Refills: 1 | Status: SHIPPED | OUTPATIENT
Start: 2023-11-09

## 2023-11-09 NOTE — PROGRESS NOTES
Due to COVID-19 ACTION PLAN, the patient's office visit was converted to a video visit. Please note that the following visit was completed using two-way, real-time interactive audio and video communication. Time Spent:  18 min    Uma Ralph is a 50year old presenting today for type 2 diabetes management. Primary care physician: Roberto Norman MD  Diabetes control has worsened: most recent A1C 8.8% ( last A1C 8.3%)      Established w me   At last visit he was reluctant to visit [facility] due to GI side effects and cost   Works as  medical transporter, driving in car  all day   Was able to restart Metformin XR and tolerating 1 tab daily   Transient diarrhea initially but feeling ok now. Admits since  he was \"feeling better\" , began \"slacking off with diet \"  Resumed  drinking more coke after he had stopped   Was up to 6 cans per day; now down to 3 pr day after seeing recent A1C   Tyring to reduce to zero intake      Not really testing but seeing 200-208 mg/dl trends if eating later  Lowest 160 mg/dl   Denies hypoglycemia   Reviewed labs today .  Lauren Shipman stopped atorvastatin rx since he had poor sleep aft taking at bedtime           Diabetes History:  Type 2 DM ~    Patient has not had hospitalizations for blood sugar issues  denies any history of pancreatitis      Previous DM therapies:  Metformin 1000mg BID; stopped  due to diarrhea     Current DM Regimen:  Glipizide ER 5mg once daily in AM   Metformin ER 500m tab daily       HGBA1C:    Lab Results   Component Value Date    A1C 8.8 (H) 2023    A1C 8.3 (A) 2023    A1C 11.9 (A) 2023     (H) 2023     Lab Results   Component Value Date    CHOLEST 224 (H) 2023    TRIG 176 (H) 2023    HDL 45 2023     (H) 2023    MICROALBCREA 9.0 2023    CREATSERUM 1.05 2023    EGFRCR 88 2023    AST 18 2023    ALT 27 2023     Lab Results   Component Value Date    TSH 3.630 04/20/2023    T4F 0.9 04/20/2023     Component      Latest Ref Rng 4/20/2023   Cholesterol, Total      <200 mg/dL 229 (H)    HDL Cholesterol      40 - 59 mg/dL 41    Triglycerides      30 - 149 mg/dL 172 (H)    LDL Cholesterol Calc      <100 mg/dL 157 (H)             DM Complications:  Microvascular:   Neuropathy: yes  Retinopathy: no  Nephropathy: no    Macrovascular:  PVD: no  CAD: no  Stroke/CVA: no        Modifying factors:  Medication adherence: yes   Barriers: cost concerns (high deductible insurance)    Recent steroids, illness or infections ( past 3m): no     Allergies: Penicillins    Past Medical History:   Diagnosis Date    Diabetes ([facility] [address]     Hyperlipidemia     Obesity      Past Surgical History:   Procedure Laterality Date    OTHER SURGICAL HISTORY      17 teeth removed, has dentures full upper and partial lower     Social History     Socioeconomic History    Marital status:    Tobacco Use    Smoking status: Never    Smokeless tobacco: Never   Substance and Sexual Activity    Alcohol use: Yes     Alcohol/week: 0.0 standard drinks of alcohol     Comment: social    Drug use: No   Other Topics Concern    Caffeine Concern Yes     Comment: diet coke; 4 large cups from [employer]    Exercise No     Comment: only at work    [address] Yes     Family History   Problem Relation Age of Onset    Heart Disorder Father         defribrillator    Diabetes Father     No Known Problems Mother     Cancer Maternal Grandmother         breast    Diabetes Paternal Grandmother      Current Medication List:   Current Outpatient Medications   Medication Sig Dispense Refill    metFORMIN  MG Oral Tablet 24 Hr Take 1 tablet (500 mg total) by mouth daily. 90 tablet 0    glipiZIDE ER 5 MG Oral Tablet 24 Hr Take 1 tablet (5 mg total) by mouth every morning. 90 tablet 1    Omega-3 Fatty Acids (FISH OIL) 1200 MG Oral Cap Take by mouth.              DM associated review of  symptoms:   Endocrine: Polyuria, polyphagia, polydipsia: no  Neurological: Paresthesias: yes   HEENT: Blurred vision: no  Skin: no rash or wounds  Hematological: Hypoglycemia: no      Review of Systems     LUNGS: denies shortness of breath   CARDIOVASCULAR: denies chest pain  GI: denies abdominal pain, nausea or diarrhea   : denies dysuria  MUSCULOSKELETAL: denies pain        Physical exam:  There were no vitals taken for this visit. There is no height or weight on file to calculate BMI. Physical Exam     Constitutional: Normal appearance   Cardiovascular: Not assessed today   Pulmonary/Chest: Effort normal  Neurological: Alert and oriented . Psychiatric: Normal mood and affect. Assessment/Plan:      Dyslipidemia:   Last  labs   LDL has increased. Resume Atorvastatin 10mg daily : ok to take in AM   If insomnia returns, contact PCP or me to trial alternate agent   update labs  in 3 m     Obesity   Declined MNT/DSME   Reminded to continue avoiding regular soda  Again Reviewed GLP1 rx but needs to investigate cost       Type 2 diabetes mellitus with hyperglycemia, without long-term current use of insulin ([facility])    A1C: 8.8% ( last A1C 8.3%)   Last DM center weight: 285 lb     Diabetes control is increased   . Reviewed with patient health impact associated with high glucose trends and the importance of better glucose control to prevent onset /progression of DM complications.    Recommendations:   Discussed again the   ADA standards of care ; using GLP1/SGLT2i rx for glycemic control as well as ASCVD benefit however pt concerned about cost - also leary of injection  Provided pt names of SGLT2/GLP and doses to contact his insurance to determine cost     In meantime, will increase glipizide 5mg--> 7.5mg daily in AM   Advised to be cautious of hypoglycemia sxs       Increase Glipizide XL 5mg--> 7.5 mg  once daily in AM (taking 5mg and 2.5mg tab)   Continue   Metformin XR 500m tab daily in AM       If cost barrier w GLP or SGLT2, consider pioglitazone         Reviewed w patient pathophysiology of diabetes, clinical significance of A1c, adverse effects of suboptimal glucose control, and goals of therapy   Reviewed the A1C test, what the value reflects and the goal for the patient. Reminded pt on A1C and blood sugar targets (Fasting < 130 and post prandial <257 ) and complications associated with hyperglycemia and uncontrolled DM (on AVS)   Recommended SMBG 2- x daily   Reviewed s/s and treatment of hypoglycemia (on AVS) and patient handout provided today     Continue with lifestyle modifications since they have positive impact on diabetes/blood sugars/health (portion control, physical activity, weight loss)   Avoid  REGULAR SODA     Reinforced timing and adherence with medication, self-monitoring of blood glucose and routine follow up      The patient is asked to return in 3 m  but recommended to contact DM clinic sooner if questions or concerns. The patient indicates understanding of these issues and agrees to the plan. No orders of the defined types were placed in this encounter. DM quality  A1C/Blood pressure: as reported above   Nephropathy screenin continue ace /arb rx. LIPID screenin Atorva  statin rx. Last dilated eye exam: Last Dilated Eye Exam: 16   Exam shows retinopathy? Eye Exam shows Diabetic Retinopathy?: No  Last diabetic foot exam: Last Foot Exam: 23  Date of last PHQ-2 depression screen: No data recorded            This has been done in good steve to provide continuity of care in the best interest of the provider-patient relationship, due to the on-going public health crisis/national emergency and because of restrictions of visitation. There are limitations of this visit as no or only very limited physical exam could be performed. Every conscious effort was taken to allow for sufficient and adequate time.   This billing visit was spent on reviewing blood sugar trends, DM related labs, medications and decision making. Appropriate medical decision-making and tests are ordered as detailed in the plan of care above. Ivan Roman understands phone or video evaluation is not a substitute for face-to-face examination or emergency care. Patient advised to go to ER or call 911 for worsening symptoms or acute distress.      Kory Staff, APRN

## 2024-01-22 DIAGNOSIS — E11.65 TYPE 2 DIABETES MELLITUS WITH HYPERGLYCEMIA, WITHOUT LONG-TERM CURRENT USE OF INSULIN (HCC): ICD-10-CM

## 2024-01-22 RX ORDER — METFORMIN HYDROCHLORIDE 500 MG/1
500 TABLET, EXTENDED RELEASE ORAL DAILY
Qty: 90 TABLET | Refills: 0 | Status: SHIPPED | OUTPATIENT
Start: 2024-01-22

## 2024-01-22 RX ORDER — GLIPIZIDE 2.5 MG/1
2.5 TABLET, EXTENDED RELEASE ORAL
Qty: 90 TABLET | Refills: 0 | Status: SHIPPED | OUTPATIENT
Start: 2024-01-22

## 2024-01-22 RX ORDER — GLIPIZIDE 5 MG/1
5 TABLET, FILM COATED, EXTENDED RELEASE ORAL EVERY MORNING
Qty: 90 TABLET | Refills: 0 | Status: SHIPPED | OUTPATIENT
Start: 2024-01-22

## 2024-01-22 NOTE — TELEPHONE ENCOUNTER
Requested Prescriptions     Pending Prescriptions Disp Refills    metFORMIN  MG Oral Tablet 24 Hr 90 tablet 0     Sig: Take 1 tablet (500 mg total) by mouth daily.    glipiZIDE ER 2.5 MG Oral Tablet 24 Hr 90 tablet 1     Sig: Take 1 tablet (2.5 mg total) by mouth daily with breakfast. Take with 5 mg daily for total dose of 7.5mg    glipiZIDE ER 5 MG Oral Tablet 24 Hr 90 tablet 1     Sig: Take 1 tablet (5 mg total) by mouth every morning.     Your appointments       Date & Time Appointment Department ([place])    Feb 22, 2024  7:30 AM CST Video Visit with Sofie Erickson APRN indeni [facility], [address] [place] [address] ([facility] [place])    Please verify your telehealth insurance benefits prior to your appointment.    You must be in the [facility] during the virtual visit.     Please use the [employer] and launch the video visit 10 minutes prior to your scheduled appointment time to ensure your camera and microphone are working properly. Once the video visit has started you will be placed in a waiting room until the provider begins the visit.     You will receive an email confirmation with instructions.  If you have questions, call your doctor's office directly.    If you are having issues or need to use a desktop/laptop, please follow the below steps:        1.       Close out all other open apps (could be competing for audio resources)  2.       Disable Bluetooth  3.       Reboot mobile device before joining the video  4.       Come off Wi-Fi and switch over to Data    Please see our Video Visit Tip Sheet if you need additional assistance.     If you believe this is an emergency, please dial 911 immediately.                indeni [facility], [address] [place] [address]  [facility] [address] [place] [address]  [address] [place] [id number] [phone]          HGBA1C:    Lab Results   Component Value Date    A1C 8.8 (H) 11/06/2023    A1C 8.3 (A)  07/24/2023    A1C 11.9 (A) 04/12/2023     (H) 11/06/2023     LOV: 11-9-23    REFILL: METFORMIN 10-25-23                GLIPIZIDE 2.5mg 11-9-23                GLIPIZIDE 5mg 11-9-23

## 2024-02-16 ENCOUNTER — LAB ENCOUNTER (OUTPATIENT)
Dept: LAB | Age: 49
End: 2024-02-16
Attending: NURSE PRACTITIONER
Payer: COMMERCIAL

## 2024-02-16 DIAGNOSIS — E11.65 TYPE 2 DIABETES MELLITUS WITH HYPERGLYCEMIA, WITHOUT LONG-TERM CURRENT USE OF INSULIN (HCC): ICD-10-CM

## 2024-02-16 LAB
ALBUMIN SERPL-MCNC: 4.6 G/DL (ref 3.2–4.8)
ALBUMIN/GLOB SERPL: 1.6 {RATIO} (ref 1–2)
ALP LIVER SERPL-CCNC: 134 U/L
ALT SERPL-CCNC: 52 U/L
ANION GAP SERPL CALC-SCNC: 12 MMOL/L (ref 0–18)
AST SERPL-CCNC: 20 U/L (ref ?–34)
BILIRUB SERPL-MCNC: 1.1 MG/DL (ref 0.3–1.2)
BUN BLD-MCNC: 13 MG/DL (ref 9–23)
BUN/CREAT SERPL: 12.7 (ref 10–20)
CALCIUM BLD-MCNC: 9.6 MG/DL (ref 8.7–10.4)
CHLORIDE SERPL-SCNC: 101 MMOL/L (ref 98–112)
CHOLEST SERPL-MCNC: 179 MG/DL (ref ?–200)
CO2 SERPL-SCNC: 24 MMOL/L (ref 21–32)
CREAT BLD-MCNC: 1.02 MG/DL
CREAT UR-SCNC: 70.3 MG/DL
EGFRCR SERPLBLD CKD-EPI 2021: 90 ML/MIN/1.73M2 (ref 60–?)
EST. AVERAGE GLUCOSE BLD GHB EST-MCNC: 229 MG/DL (ref 68–126)
FASTING PATIENT LIPID ANSWER: NO
FASTING STATUS PATIENT QL REPORTED: NO
GLOBULIN PLAS-MCNC: 2.9 G/DL (ref 2.8–4.4)
GLUCOSE BLD-MCNC: 333 MG/DL (ref 70–99)
HBA1C MFR BLD: 9.6 % (ref ?–5.7)
HDLC SERPL-MCNC: 43 MG/DL (ref 40–59)
LDLC SERPL CALC-MCNC: 102 MG/DL (ref ?–100)
MICROALBUMIN UR-MCNC: 0.4 MG/DL
MICROALBUMIN/CREAT 24H UR-RTO: 5.7 UG/MG (ref ?–30)
NONHDLC SERPL-MCNC: 136 MG/DL (ref ?–130)
OSMOLALITY SERPL CALC.SUM OF ELEC: 297 MOSM/KG (ref 275–295)
POTASSIUM SERPL-SCNC: 4 MMOL/L (ref 3.5–5.1)
PROT SERPL-MCNC: 7.5 G/DL (ref 5.7–8.2)
SODIUM SERPL-SCNC: 137 MMOL/L (ref 136–145)
TRIGL SERPL-MCNC: 195 MG/DL (ref 30–149)
TSI SER-ACNC: 3.27 MIU/ML (ref 0.55–4.78)
VLDLC SERPL CALC-MCNC: 33 MG/DL (ref 0–30)

## 2024-02-16 PROCEDURE — 82043 UR ALBUMIN QUANTITATIVE: CPT

## 2024-02-16 PROCEDURE — 80061 LIPID PANEL: CPT

## 2024-02-16 PROCEDURE — 83036 HEMOGLOBIN GLYCOSYLATED A1C: CPT

## 2024-02-16 PROCEDURE — 84443 ASSAY THYROID STIM HORMONE: CPT

## 2024-02-16 PROCEDURE — 80053 COMPREHEN METABOLIC PANEL: CPT

## 2024-02-16 PROCEDURE — 82570 ASSAY OF URINE CREATININE: CPT

## 2024-02-16 PROCEDURE — 36415 COLL VENOUS BLD VENIPUNCTURE: CPT

## 2024-02-22 ENCOUNTER — TELEMEDICINE (OUTPATIENT)
Dept: ENDOCRINOLOGY CLINIC | Facility: CLINIC | Age: 49
End: 2024-02-22
Payer: COMMERCIAL

## 2024-02-22 DIAGNOSIS — E78.5 DYSLIPIDEMIA: ICD-10-CM

## 2024-02-22 DIAGNOSIS — E66.01 MORBID OBESITY (HCC): ICD-10-CM

## 2024-02-22 DIAGNOSIS — E11.65 TYPE 2 DIABETES MELLITUS WITH HYPERGLYCEMIA, WITHOUT LONG-TERM CURRENT USE OF INSULIN (HCC): Primary | ICD-10-CM

## 2024-02-22 PROCEDURE — 99214 OFFICE O/P EST MOD 30 MIN: CPT | Performed by: NURSE PRACTITIONER

## 2024-02-22 RX ORDER — INSULIN DEGLUDEC INJECTION 100 U/ML
INJECTION, SOLUTION SUBCUTANEOUS
COMMUNITY
Start: 2024-02-22

## 2024-02-22 RX ORDER — SEMAGLUTIDE 0.68 MG/ML
0.5 INJECTION, SOLUTION SUBCUTANEOUS WEEKLY
Qty: 3 ML | Refills: 0 | Status: SHIPPED | OUTPATIENT
Start: 2024-02-22

## 2024-02-22 RX ORDER — PEN NEEDLE, DIABETIC 30 GX3/16"
1 NEEDLE, DISPOSABLE MISCELLANEOUS DAILY
COMMUNITY
Start: 2024-02-22

## 2024-02-22 NOTE — PATIENT INSTRUCTIONS
Your A1C has worsened. 9.6%   Kidneys are healthy right now  Cholesterol level did improve - please stay on the atorvastatin     Liver labs were increased most likely from the sugars, cholesterol and weight   The ozempic will not only help your diabetes but also help protect your heart, brain and kidneys as well as improve your weight and avoid fatty liver (scarring of liver)       Please check cost of Ozempic at your pharmacy - it was sent there today   If you feel it is affordable, we can offer you a starter kit to get started before you have to pay for the prescription to be sure you are tolerating the Ozempic       There is an ozempic co pay card. It now offers $25 for either 30 d or 90 day supply  ( max discount 150$ )   You have the option to text for new co pay card  Text BEGIN to 17262 - Or you can visit: [employer]     For now we will start a low dose, slow acting basal insulin to help your blood sugars and recover your insulin making cells in the pancreas     Tresiba insulin Pen: 10 units once daily     We will walk you through your first injection Monday - and provide you with the insulin and pen needles to get you started.     Continue       Glipizide ER 5mg/2.5 : 1 tab  daily in AM (7.5mg daily)   Metformin ER 500m tab daily     Resume testing your blood sugars at least 2 x daily        It would be best to change up the times of day that you are testing your sugar.   Always test before breakfast (fasting) and then alternate testing blood sugar -2 hours after your meals.     American Diabetes Association: blood sugar targets:     Fasting blood sugar (before breakfast) Target:    (ideally less than 110)  2 hours after eating less than 180 (ideally less than  150 )     Call for blood sugars less than  75 or greater than  200 more than 2 times in a week         Always carry glucose with your (juice, fruit or glucose tabs) while driving - since insulin and/or glipizide can drop your blood  sugars unexpectedly       Watch for low blood sugars: (less than 70 )    Treatment of Low Blood Glucose Action Plan  1. Check blood glucose to be sure that it is low. You cannot  always go by symptoms or how you feel. If in doubt, treat your low blood glucose anyway.  Rule of 15 :     2. Take 15 grams of carbohydrate (carb). Here are some choices:    4 oz. regular fruit juice  3-4 glucose tablets  6 oz. regular soda   7-8 jelly beans    3. Recheck blood glucose after 10-15 minutes. If blood glucose is still low (less than 70 mg/dl) repeat the treatment (step 2).    4. If your next meal is more than one hour away, eat a small snack.    5. If you’re not sure what caused your low blood glucose, call your healthcare provider.    6. Always check your blood glucose before you drive

## 2024-02-22 NOTE — PROGRESS NOTES
Due to COVID-19 ACTION PLAN, the patient's office visit was converted to a video visit. Please note that the following visit was completed using two-way, real-time interactive audio and video communication.  Time Spent:  20   min    Rajeev Lazo is a 49 year old presenting today for type 2 diabetes management.   Primary care physician: Og Zavaleta MD  Diabetes control has again worsened: most recent A1C 9.6% ( last A1C 8.8%)  Reviewed labs today . Admits stopped atorvastatin rx since he had poor sleep aft taking at bedtime   In past he has been resistant to DM med changes due to cost and \"fear of side effects\"     Glucose was 333 mg/dl on CMP (non fasting)   Admits \"he slacked\"   After seeing his lab resulsts, he admits he anabella restart checking BG levels past few weeks:     206 - 261 mg/dl (has only tested a \"few times\")       Works as  medical transporter, driving in car  all day , erratic hours    Time barrier, cost barrier of DM meds  Did not yet investigate cost of Ozempic w his plan             Diabetes History:  Type 2 DM ~ 2017   Patient has not had hospitalizations for blood sugar issues  denies any history of pancreatitis      Previous DM therapies:  Metformin 1000mg BID; stopped  due to diarrhea     Current DM Regimen:  Glipizide ER 5mg/2.5 : 1 tab  daily in AM (7.5mg daily)   Metformin ER 500m tab daily       HGBA1C:    Lab Results   Component Value Date    A1C 9.6 (H) 2024    A1C 8.8 (H) 2023    A1C 8.3 (A) 2023     (H) 2024       Lab Results   Component Value Date    CHOLEST 179 2024    CHOLEST 224 (H) 2023    TRIG 195 (H) 2024    TRIG 176 (H) 2023    HDL 43 2024    HDL 45 2023     (H) 2024     (H) 2023     Lab Results   Component Value Date    MICROALBCREA 5.7 2024    MICROALBCREA 9.0 2023      Lab Results   Component Value Date    CREATSERUM 1.02 2024    CREATSERUM 1.05  11/06/2023    EGFRCR 90 02/16/2024    EGFRCR 88 11/06/2023     Lab Results   Component Value Date    AST 20 02/16/2024    AST 18 11/06/2023    ALT 52 (H) 02/16/2024    ALT 27 11/06/2023       Lab Results   Component Value Date    TSH 3.271 02/16/2024    TSH 3.630 04/20/2023    T4F 0.9 04/20/2023             DM Complications:  Microvascular:   Neuropathy: yes  Retinopathy: no  Nephropathy: no    Macrovascular:  PVD: no  CAD: no  Stroke/CVA: no        Modifying factors:  Medication adherence: yes   Barriers: cost concerns (high deductible insurance)    Recent steroids, illness or infections ( past 3m): no     Allergies: Penicillins    Past Medical History:   Diagnosis Date    Diabetes (HCC)     Hyperlipidemia     Obesity      Past Surgical History:   Procedure Laterality Date    OTHER SURGICAL HISTORY      17 teeth removed, has dentures full upper and partial lower     Social History     Socioeconomic History    Marital status:    Tobacco Use    Smoking status: Never    Smokeless tobacco: Never   Substance and Sexual Activity    Alcohol use: Yes     Alcohol/week: 0.0 standard drinks of alcohol     Comment: social    Drug use: No   Other Topics Concern    Caffeine Concern Yes     Comment: diet coke; 4 large cups from [email or web address]    Exercise No     Comment: only at work    Seat Belt Yes     Family History   Problem Relation Age of Onset    Heart Disorder Father         defribrillator    Diabetes Father     No Known Problems Mother     Cancer Maternal Grandmother         breast    Diabetes Paternal Grandmother      Current Medication List:   Current Outpatient Medications   Medication Sig Dispense Refill    semaglutide (OZEMPIC, 0.25 OR 0.5 MG/DOSE,) 2 MG/3ML Subcutaneous Solution Pen-injector Inject 0.5 mg into the skin once a week. 3 mL 0    insulin degludec (TRESIBA FLEXTOUCH) 100 UNIT/ML Subcutaneous Solution Pen-injector 10 units daily      Insulin Pen Needle (PEN NEEDLES) 32G X 4 MM Does not apply Misc 1  Device daily.      metFORMIN  MG Oral Tablet 24 Hr Take 1 tablet (500 mg total) by mouth daily. 90 tablet 0    glipiZIDE ER 2.5 MG Oral Tablet 24 Hr Take 1 tablet (2.5 mg total) by mouth daily with breakfast. Take with 5 mg daily for total dose of 7.5mg 90 tablet 0    glipiZIDE ER 5 MG Oral Tablet 24 Hr Take 1 tablet (5 mg total) by mouth every morning. 90 tablet 0    atorvastatin 10 MG Oral Tab Take 1 tablet (10 mg total) by mouth nightly. 90 tablet 1    Omega-3 Fatty Acids (FISH OIL) 1200 MG Oral Cap Take by mouth.             DM associated review of  symptoms:   Endocrine: Polyuria, polyphagia, polydipsia: no  Neurological: Paresthesias: yes   HEENT: Blurred vision: no  Skin: no rash or wounds  Hematological: Hypoglycemia: no      Review of Systems     LUNGS: denies shortness of breath   CARDIOVASCULAR: denies chest pain  GI: denies abdominal pain, nausea or diarrhea   : denies dysuria  MUSCULOSKELETAL: denies pain        Physical exam:  There were no vitals taken for this visit.  There is no height or weight on file to calculate BMI.    Physical Exam     Constitutional: Normal appearance   Cardiovascular: Not assessed today   Pulmonary/Chest: Effort normal  Neurological: Alert and oriented .   Psychiatric: Normal mood and affect.         Assessment/Plan:      Dyslipidemia:   Last  labs 2-2024   LDL has improved, but still above 100mg/dl   Cannot tolerate higher dose atorva rx.  CPM and focus on healthier eating    Obesity   Declined MNT/DSME   Reminded to continue avoiding regular soda  Again Reviewed GLP1 rx but needs to investigate cost--> r x sent today to his pharmacy        Type 2 diabetes mellitus with hyperglycemia, without long-term current use of insulin ([facility])    A1C: 9.6% ( last A1C 8.8%)   Last DM center weight: 285 lb     Diabetes control is poorly controlled     Reviewed with patient health impact associated with high glucose trends and the importance of better glucose control to prevent  onset /progression of DM complications.   Recommendations:   Discussed again the    ADA standards of care ; using GLP1/SGLT2i rx for glycemic control as well as ASCVD benefit however pt concerned about cost - also leary of injection  Provided pt names of SGLT2/GLP and doses to contact his insurance to determine cost   For now will start basal insulin:   Tresiba U 100 flex touch: 10 units once daily   ~scheduled for nurse visit  at 215 pm for insulin  /start   Continue    Glipizide XL 2.5mg /5mg->  7.5 mg  once daily in AM   Metformin XR 500m tab daily in AM     Ozempic rx was sent to pharmacy to check on cost - if affordable co pay, pt will let me know and we can get him started on ozempic  (If ozempic started, would delay insulin start)   ~pt needs to notify me before Monday of the outcome at pharmacy          Reviewed  clinical significance of A1c, adverse effects of suboptimal glucose control, and goals of therapy   Reviewed the A1C test, what the value reflects and the goal for the patient.   Reminded pt on A1C and blood sugar targets (Fasting < 130 and post prandial <180 ) and complications associated with hyperglycemia and uncontrolled DM (on AVS)   Recommended SMBG 2- x daily   Reviewed s/s and treatment of hypoglycemia (on AVS) and patient handout provided today     Continue with lifestyle modifications since they have positive impact on diabetes/blood sugars/health (portion control, physical activity, weight loss)   Reinforced timing and adherence with medication, self-monitoring of blood glucose and routine follow up  Prefers telehealth appts due to his work schedule     Telehealth in 4- 6 weeks and  asked to return in 3 m  but recommended to contact DM clinic sooner if questions or concerns.    The patient indicates understanding of these issues and agrees to the plan.      Orders Placed This Encounter    semaglutide (OZEMPIC, 0.25 OR 0.5 MG/DOSE,) 2 MG/3ML Subcutaneous  Solution Pen-injector     Sig: Inject 0.5 mg into the skin once a week.     Dispense:  3 mL     Refill:  0    insulin degludec (TRESIBA FLEXTOUCH) 100 UNIT/ML Subcutaneous Solution Pen-injector     Sig: 10 units daily    Insulin Pen Needle (PEN NEEDLES) 32G X 4 MM Does not apply Misc     Si Device daily.     DM quality  A1C/Blood pressure: as reported above   Nephropathy screening -  continue ace /arb rx.   LIPID screenin  Atorva  statin rx.   Last dilated eye exam: Last Dilated Eye Exam: 16   Exam shows retinopathy? Eye Exam shows Diabetic Retinopathy?: No  Last diabetic foot exam: Last Foot Exam: 23        This has been done in good steve to provide continuity of care in the best interest of the provider-patient relationship, due to the on-going public health crisis/national emergency and because of restrictions of visitation.  There are limitations of this visit as no or only very limited physical exam could be performed.  Every conscious effort was taken to allow for sufficient and adequate time.  This billing visit was spent on reviewing blood sugar trends, DM related labs, medications and decision making.  Appropriate medical decision-making and tests are ordered as detailed in the plan of care above.      Rajeev Violet understands phone or video evaluation is not a substitute for face-to-face examination or emergency care. Patient advised to go to ER or call 911 for worsening symptoms or acute distress.     Sofie Erickson, APRN

## 2024-02-23 ENCOUNTER — TELEPHONE (OUTPATIENT)
Dept: ENDOCRINOLOGY CLINIC | Facility: CLINIC | Age: 49
End: 2024-02-23

## 2024-02-23 DIAGNOSIS — E11.65 TYPE 2 DIABETES MELLITUS WITH HYPERGLYCEMIA, WITHOUT LONG-TERM CURRENT USE OF INSULIN (HCC): Primary | ICD-10-CM

## 2024-02-23 RX ORDER — INSULIN DEGLUDEC INJECTION 100 U/ML
10 INJECTION, SOLUTION SUBCUTANEOUS DAILY
Qty: 9 ML | Refills: 0 | Status: SHIPPED | OUTPATIENT
Start: 2024-02-23 | End: 2024-02-26

## 2024-02-23 NOTE — TELEPHONE ENCOUNTER
Pt called back - valery rescheduled him for [place] for insulin help Monday with vivienne. He also said he found the insulin for an affordable price so he is good with proceeding, he was hoping we'd be able to provide him a sample to start when he comes in monday

## 2024-02-23 NOTE — TELEPHONE ENCOUNTER
Insulin Rx sent to check price - can change to 30 days if needed as well for less cost but sometimes a better rate is given for 90 days. If he will be starting insulin needs to reschedule nurse visit.

## 2024-02-23 NOTE — TELEPHONE ENCOUNTER
Please see previous note as well-  Pt wants to check price insulin price at pharmacy, pended 90 day supply with refills for Tresiba U 100 flex touch: 10 units once daily, which was to to be started at some point after office visit also depending on cost of Ozempic per office visit note. Pt checked with insurance about Ozempic and it is costing too much ($894.32), not sure if he used a coupon, or maybe we can recommend he try that as well. Pt canceled nurse visit on Monday which was to officially start insulin, unless Ozempic was affordable.   Please advise.

## 2024-02-23 NOTE — TELEPHONE ENCOUNTER
Pt had video visit with Sofie on Thursday. Checked price of .Ozempic with [employer] and it was $894.32  Called Pharm and they can not give him a price of the insulin unless a rx is there . So could we send a rx to his pharm so he could see the price with his insurance. He canceled nurse visit on  Monday until he knows the price of the insulin

## 2024-02-26 ENCOUNTER — NURSE ONLY (OUTPATIENT)
Dept: ENDOCRINOLOGY CLINIC | Facility: CLINIC | Age: 49
End: 2024-02-26
Payer: COMMERCIAL

## 2024-02-26 ENCOUNTER — NURSE ONLY (OUTPATIENT)
Dept: INTERNAL MEDICINE CLINIC | Facility: CLINIC | Age: 49
End: 2024-02-26
Payer: COMMERCIAL

## 2024-02-26 DIAGNOSIS — E11.65 TYPE 2 DIABETES MELLITUS WITH HYPERGLYCEMIA, WITHOUT LONG-TERM CURRENT USE OF INSULIN (HCC): ICD-10-CM

## 2024-02-26 DIAGNOSIS — E11.65 TYPE 2 DIABETES MELLITUS WITH HYPERGLYCEMIA, WITHOUT LONG-TERM CURRENT USE OF INSULIN (HCC): Primary | ICD-10-CM

## 2024-02-26 RX ORDER — INSULIN DEGLUDEC INJECTION 100 U/ML
INJECTION, SOLUTION SUBCUTANEOUS
Qty: 15 ML | Refills: 0 | Status: SHIPPED | OUTPATIENT
Start: 2024-02-26

## 2024-02-26 NOTE — TELEPHONE ENCOUNTER
Message from [facility] pharmacy stating they received script for Tresiba, but can not split boxes. Requested new script for 15 ml.  Pend to provider.    LOV: 02/22/24  Future Appointments   Date Time Provider Department Center   2/26/2024  2:15 PM [facility]   4/4/2024 10:00 AM Sofie Erickson APRN [facility]       Last A1c value was 9.6% done 2/16/2024.

## 2024-02-26 NOTE — PROGRESS NOTES
Pt here to start basal insulin per YOJANA Erickson NP due to hyperglycemia     Last A1c value was 9.6% done 2/16/2024.    instructed patient on insulin pen use, dosing insulin with the pen, pen needles, priming pen, site selection for insulin injections, rotation and insulin injection administration.  Pt was provide rx and co pay card for Tresiba insulin       Handouts give for patient reference   Tresiba  Insulin dose was self-administered today in clinic and demonstrated good technique and understanding of above content.

## 2024-02-28 ENCOUNTER — OFFICE VISIT (OUTPATIENT)
Dept: OTHER | Facility: HOSPITAL | Age: 49
End: 2024-02-28
Attending: PREVENTIVE MEDICINE
Payer: COMMERCIAL

## 2024-02-28 DIAGNOSIS — Z02.89 VISIT FOR OCCUPATIONAL HEALTH EXAMINATION: Primary | ICD-10-CM

## 2024-02-28 LAB
HBV SURFACE AG SER-ACNC: <0.1 [IU]/L
HBV SURFACE AG SERPL QL IA: NONREACTIVE
HCV AB SERPL QL IA: NONREACTIVE
HIV 1+2 AB+HIV1 P24 AG SERPL QL IA: NONREACTIVE

## 2024-02-28 PROCEDURE — 86701 HIV-1ANTIBODY: CPT

## 2024-02-28 PROCEDURE — 87340 HEPATITIS B SURFACE AG IA: CPT

## 2024-02-28 PROCEDURE — 86803 HEPATITIS C AB TEST: CPT

## 2024-03-11 RX ORDER — PEN NEEDLE, DIABETIC 30 GX3/16"
1 NEEDLE, DISPOSABLE MISCELLANEOUS DAILY
Qty: 50 EACH | Refills: 0 | Status: SHIPPED | OUTPATIENT
Start: 2024-03-11

## 2024-04-03 DIAGNOSIS — E11.65 TYPE 2 DIABETES MELLITUS WITH HYPERGLYCEMIA, WITHOUT LONG-TERM CURRENT USE OF INSULIN (HCC): Primary | ICD-10-CM

## 2024-04-03 RX ORDER — ATORVASTATIN CALCIUM 10 MG/1
10 TABLET, FILM COATED ORAL NIGHTLY
Qty: 90 TABLET | Refills: 3 | Status: SHIPPED | OUTPATIENT
Start: 2024-04-03

## 2024-04-03 NOTE — TELEPHONE ENCOUNTER
Requested Prescriptions     Pending Prescriptions Disp Refills    atorvastatin 10 MG Oral Tab 90 tablet 1     Sig: Take 1 tablet (10 mg total) by mouth nightly.     Future Appointments   Date Time Provider Department Center   4/4/2024 10:00 AM Sofie Erickson APRN [facility]     Last A1c value was 9.6% done 2/16/2024.  Refill 11/9/23  LOV 2/22/24

## 2024-04-04 ENCOUNTER — TELEMEDICINE (OUTPATIENT)
Dept: ENDOCRINOLOGY CLINIC | Facility: CLINIC | Age: 49
End: 2024-04-04
Payer: COMMERCIAL

## 2024-04-04 DIAGNOSIS — E78.5 DYSLIPIDEMIA: ICD-10-CM

## 2024-04-04 DIAGNOSIS — E11.65 TYPE 2 DIABETES MELLITUS WITH HYPERGLYCEMIA, WITHOUT LONG-TERM CURRENT USE OF INSULIN (HCC): Primary | ICD-10-CM

## 2024-04-04 DIAGNOSIS — E78.2 MIXED HYPERLIPIDEMIA: ICD-10-CM

## 2024-04-04 DIAGNOSIS — E66.01 MORBID OBESITY (HCC): ICD-10-CM

## 2024-04-04 PROCEDURE — 99214 OFFICE O/P EST MOD 30 MIN: CPT | Performed by: NURSE PRACTITIONER

## 2024-04-04 RX ORDER — METFORMIN HYDROCHLORIDE 500 MG/1
500 TABLET, EXTENDED RELEASE ORAL DAILY
Qty: 90 TABLET | Refills: 1 | Status: SHIPPED | OUTPATIENT
Start: 2024-04-04

## 2024-04-04 NOTE — PATIENT INSTRUCTIONS
We can update the labs to show improved blood sugar trends   I have ordered   MEDICATIONS:   Changes today   Increase  Tresiba flex touch U 100: 12  units once daily   Continue:   Glipizide ER 5mg/2.5 : 1 tab  daily in AM (7.5mg daily)   Metformin ER 500m tab daily       Blood sugar targets:  Before breakfast:   (preferably less than 110)  2 hours After meals: less than 180 (preferably less than 150)   Please call me if you are having blood sugars less than 75 or more than 200 more than 2 days in a week time span.     Hypoglycemia:    Watch for low blood sugars: (less than 70)  Symptoms of low blood sugar:   Shakiness or dizziness  Cold, clammy skin or sweating  Feeling hungry  Headache  Nervousness  A hard, fast heartbeat  Weakness  Confusion or irritability  Blurred eyesight  Having nightmares or waking up confused or sweating  Numbness or tingling in the lips or tongue     Treatment of Low Blood sugar Action Plan  1. Check blood glucose to be sure that it is low. You can’t always go by symptoms. If in doubt, treat your low blood glucose anyway.  2. Take 15 grams of carbohydrate (carb). Here are some choices:  4 oz. regular fruit juice  3-4 glucose tablets  6 oz. regular soda   7-8 jelly beans  3. Recheck blood glucose after 10-15 minutes. If blood glucose is still low (less than 70 mg/dl) repeat the treatment (step 2).  4. If your next meal is more than one hour away, eat a small snack.  5. If you’re not sure what caused your low blood glucose, call us .  6. Always check your blood glucose before you drive         Diabetes health monitoring      1. LABS: It is important to monitor your kidney function (blood and urine protein levels) , liver function tests and cholesterol levels when you have diabetes. If your primary care provider has not ordered these tests, I will order them for you.   2. FOOT exams:  daily foot inspections for foot wounds or skin changes are important for foot care. Any unusual  changes should be reported immediately.  3. EYE exams: Checking your eyes for diabetes changes is important and you should have a dilated eye exam done by an eye doctor EVERY year since these changes occur in the BACK of the eye and not visible by you.     Office protocols:   My Chart Messages - Our goal is to respond to all My Chart messages within 2 business days of receipt. Messages received after 4 pm on Friday, will be addressed on Monday, except if holiday.   Please DO NOT send urgent messages through My Chart as these messages are not monitored after hours.       Patient Calls -We make every attempt to answer all patient calls within 1 business day. Calls that come in after 4pm daily will be addressed the following business day. Nurses are available to answer your calls Monday - Friday from 8am - 4 pm except for holidays.      Refill policies:     Refills may be delayed if you have not had a recent office visit or recent labs as requested by your prescriber.       Our goal is to process your refill within 3  business day of receiving the refill request.   Contact your pharmacy at least 5 days prior to running out of medication and have them send an electronic request or submit a refill through My chart: select  “request refill” option in your [employer] account.  Refills are not addressed after hours or on weekends; covering providers  do not authorize routine medications on weekends.  Refills  received after 4pm on Fridays will be addressed on Mondays.    If  your prescription is due for a refill, and you are due for a follow up appointment., this will  may impact the ability for you to get a 90 supply if requested.   Yearly blood tests are  required for many medications to insure safe prescribing. If you are due for labs, you will have 30 days to complete the  requested labs before future refills are authorized.   In the event that your preferred pharmacy does not have the requested medication in stock (e.g.  Backordered), it is your responsibility to find another pharmacy that has the requested medication available.  We will gladly send a new prescription to that pharmacy at your request.  To best provide you care, patients receiving routine medications need to be seen at least twice per year. However if the A1C is above 8% you will be need to be seen more frequently as recommended by provider and this will also impact your ability get obtain refills          Thank you   Sofie Erickson   [phone]

## 2024-04-04 NOTE — PROGRESS NOTES
Due to COVID-19 ACTION PLAN, the patient's office visit was converted to a video visit. Please note that the following visit was completed using two-way, real-time interactive audio and video communication.  Time Spent:  16   min    Rajeev Lazo is a 49 year old presenting today for type 2 diabetes management.   Primary care physician: Og Zavaleta MD  Most recent A1C 9.6% ( last A1C 8.8%)  Needs update   Started on basal insulin ~ 5 weeks ago     Feels he is doing well and admits that his energy levels are improved.       Testing 2 x daily   Trends   Fasting trends under 200s  Lowest 168 mg/dl   Later in day trends: 180s         Works as  medical transporter, driving in car  all day , erratic hours    Time barrier, cost barrier of DM meds  Did nvestigate cost of Ozempic w his plan     Cost was $870 despite activating co pay card -           Diabetes History:  Type 2 DM ~    Patient has not had hospitalizations for blood sugar issues  denies any history of pancreatitis      Previous DM therapies:  Metformin 1000mg ; stopped  due to diarrhea     Current DM Regimen:  Glipizide ER 5mg/2.5 : 1 tab  daily in AM (7.5mg daily)   Metformin ER 500m tab daily   Tresiba flex touch U 100: 10 units once daily       HGBA1C:    Lab Results   Component Value Date    A1C 9.6 (H) 2024    A1C 8.8 (H) 2023    A1C 8.3 (A) 2023     (H) 2024       Lab Results   Component Value Date    CHOLEST 179 2024    CHOLEST 224 (H) 2023    TRIG 195 (H) 2024    TRIG 176 (H) 2023    HDL 43 2024    HDL 45 2023     (H) 2024     (H) 2023     Lab Results   Component Value Date    MICROALBCREA 5.7 2024    MICROALBCREA 9.0 2023      Lab Results   Component Value Date    CREATSERUM 1.02 2024    CREATSERUM 1.05 2023    EGFRCR 90 2024    EGFRCR 88 2023     Lab Results   Component Value Date    AST 20 2024     AST 18 11/06/2023    ALT 52 (H) 02/16/2024    ALT 27 11/06/2023       Lab Results   Component Value Date    TSH 3.271 02/16/2024    TSH 3.630 04/20/2023    T4F 0.9 04/20/2023             DM Complications:  Microvascular:   Neuropathy: yes  Retinopathy: no  Nephropathy: no    Macrovascular:  PVD: no  CAD: no  Stroke/CVA: no        Modifying factors:  Medication adherence: yes   Barriers: cost concerns (high deductible insurance)    Recent steroids, illness or infections ( past 3m): no     Allergies: Penicillins    Past Medical History:   Diagnosis Date    Diabetes (HCC)     Hyperlipidemia     Obesity      Past Surgical History:   Procedure Laterality Date    OTHER SURGICAL HISTORY      17 teeth removed, has dentures full upper and partial lower     Social History     Socioeconomic History    Marital status:    Tobacco Use    Smoking status: Never    Smokeless tobacco: Never   Substance and Sexual Activity    Alcohol use: Yes     Alcohol/week: 0.0 standard drinks of alcohol     Comment: social    Drug use: No   Other Topics Concern    Caffeine Concern Yes     Comment: diet coke; 4 large cups from [employer]    Exercise No     Comment: only at work    Seat Belt Yes     Family History   Problem Relation Age of Onset    Heart Disorder Father         defribrillator    Diabetes Father     No Known Problems Mother     Cancer Maternal Grandmother         breast    Diabetes Paternal Grandmother      Current Medication List:   Current Outpatient Medications   Medication Sig Dispense Refill    atorvastatin 10 MG Oral Tab Take 1 tablet (10 mg total) by mouth nightly. 90 tablet 3    Insulin Pen Needle (PEN NEEDLES) 32G X 4 MM Does not apply Misc 1 Device daily. 50 each 0    insulin degludec (TRESIBA FLEXTOUCH) 100 UNIT/ML Subcutaneous Solution Pen-injector Uses up to 20 units daily as directed with dose titration 15 mL 0    semaglutide (OZEMPIC, 0.25 OR 0.5 MG/DOSE,) 2 MG/3ML Subcutaneous Solution Pen-injector Inject 0.5  mg into the skin once a week. 3 mL 0    insulin degludec (TRESIBA FLEXTOUCH) 100 UNIT/ML Subcutaneous Solution Pen-injector 10 units daily      metFORMIN  MG Oral Tablet 24 Hr Take 1 tablet (500 mg total) by mouth daily. 90 tablet 0    glipiZIDE ER 2.5 MG Oral Tablet 24 Hr Take 1 tablet (2.5 mg total) by mouth daily with breakfast. Take with 5 mg daily for total dose of 7.5mg 90 tablet 0    glipiZIDE ER 5 MG Oral Tablet 24 Hr Take 1 tablet (5 mg total) by mouth every morning. 90 tablet 0    Omega-3 Fatty Acids (FISH OIL) 1200 MG Oral Cap Take by mouth.             DM associated review of  symptoms:   Endocrine: Polyuria, polyphagia, polydipsia: no  Neurological: Paresthesias: yes   HEENT: Blurred vision: no  Skin: no rash or wounds  Hematological: Hypoglycemia: no      Review of Systems     LUNGS: denies shortness of breath   CARDIOVASCULAR: denies chest pain  GI: denies abdominal pain, nausea or diarrhea   : denies dysuria  MUSCULOSKELETAL: denies pain        Physical exam:  There were no vitals taken for this visit.  There is no height or weight on file to calculate BMI.    Physical Exam     Constitutional: Normal appearance   Cardiovascular: Not assessed today   Pulmonary/Chest: Effort normal  Neurological: Alert and oriented .   Psychiatric: Normal mood and affect.         Assessment/Plan:      Dyslipidemia:   Last  labs 2-2024   Continue atorvastatin 10 mg once daily   Cannot tolerate higher dose atorva rx.  Reminded to  focus on healthy eating  Update labs     Obesity   Continue focus on healthier eating and  avoiding regular soda  Cost barrier w GLP1 rx ( >$800)       Type 2 diabetes mellitus with hyperglycemia, without long-term current use of insulin ([facility])    A1C: 9.6% ( last A1C 8.8%)   Recall of trends show improvement   Ordered A1C   Last DM center weight: 285 lb     Diabetes control is improving since starting basal insulin rx    Reviewed with patient health impact associated with high glucose  trends and the importance of better glucose control to prevent onset /progression of DM complications.   Despite DM /ASCVD benefit, cost barriers w GLP1/SGLT2 rx (has high deductible insurance )       Increase Tresiba U 100 flex touch: 10 --> 12 units once daily   Continue    Glipizide XL 2.5mg /5mg->  7.5 mg  once daily in AM   Metformin XR 500m tab daily in AM (cannot tolerate higher dose)     Reviewed  clinical significance of A1c, adverse effects of suboptimal glucose control, and goals of therapy   Reviewed the A1C test, what the value reflects and the goal for the patient.   Reminded pt on A1C and blood sugar targets (Fasting < 130 and post prandial <180 ) and complications associated with hyperglycemia and uncontrolled DM (on AVS)   Recommended SMBG 2- x daily   Reviewed s/s and treatment of hypoglycemia (on AVS) and patient handout provided today     Continue with lifestyle modifications since they have positive impact on diabetes/blood sugars/health (portion control, physical activity, weight loss)   Reinforced timing and adherence with medication, self-monitoring of blood glucose and routine follow up  Prefers telehealth appts due to his work schedule     Telehealth appt in  in 3 m  but recommended to contact DM clinic sooner if questions or concerns.    The patient indicates understanding of these issues and agrees to the plan.      No orders of the defined types were placed in this encounter.    DM quality  A1C/Blood pressure: as reported above   Nephropathy screening -  continue ace /arb rx.   LIPID screenin-  Atorvastatin rx.   Last dilated eye exam: Last Dilated Eye Exam: 24   Exam shows retinopathy? Eye Exam shows Diabetic Retinopathy?: No  Last diabetic foot exam: Last Foot Exam: 23        This has been done in good steve to provide continuity of care in the best interest of the provider-patient relationship, due to the on-going public health crisis/national emergency  and because of restrictions of visitation.  There are limitations of this visit as no or only very limited physical exam could be performed.  Every conscious effort was taken to allow for sufficient and adequate time.  This billing visit was spent on reviewing blood sugar trends, DM related labs, medications and decision making.  Appropriate medical decision-making and tests are ordered as detailed in the plan of care above.      Rajeev Douglasleila understands phone or video evaluation is not a substitute for face-to-face examination or emergency care. Patient advised to go to ER or call 911 for worsening symptoms or acute distress.     Sofie Erickson, APRN

## 2024-05-04 ENCOUNTER — PATIENT MESSAGE (OUTPATIENT)
Dept: ENDOCRINOLOGY CLINIC | Facility: CLINIC | Age: 49
End: 2024-05-04

## 2024-05-06 RX ORDER — GLIPIZIDE 5 MG/1
5 TABLET, FILM COATED, EXTENDED RELEASE ORAL EVERY MORNING
Qty: 90 TABLET | Refills: 0 | Status: SHIPPED | OUTPATIENT
Start: 2024-05-06

## 2024-05-06 RX ORDER — GLIPIZIDE 2.5 MG/1
2.5 TABLET, EXTENDED RELEASE ORAL
Qty: 90 TABLET | Refills: 0 | Status: SHIPPED | OUTPATIENT
Start: 2024-05-06

## 2024-05-06 NOTE — TELEPHONE ENCOUNTER
Requested Prescriptions     Pending Prescriptions Disp Refills    glipiZIDE ER 2.5 MG Oral Tablet 24 Hr 90 tablet 0     Sig: Take 1 tablet (2.5 mg total) by mouth daily with breakfast. Take with 5 mg daily for total dose of 7.5mg    glipiZIDE ER 5 MG Oral Tablet 24 Hr 90 tablet 0     Sig: Take 1 tablet (5 mg total) by mouth every morning.     Future Appointments   Date Time Provider Department Center   6/6/2024  7:30 AM Sofie Erickson APRN [facility]     Last A1c value was 9.6% done 2/16/2024.  Refill 1/22/24  LOV 4/4/24

## 2024-05-08 NOTE — TELEPHONE ENCOUNTER
Called pt and informed of labs needing to be done. Pt stated he did receive message and will get them done before his appt coming up.  Future Appointments   Date Time Provider Department Center   6/6/2024  7:30 AM Sofie Erickson APRN [facility]

## 2024-05-29 ENCOUNTER — LAB ENCOUNTER (OUTPATIENT)
Dept: LAB | Facility: HOSPITAL | Age: 49
End: 2024-05-29
Attending: NURSE PRACTITIONER
Payer: COMMERCIAL

## 2024-05-29 DIAGNOSIS — E11.65 TYPE 2 DIABETES MELLITUS WITH HYPERGLYCEMIA, WITHOUT LONG-TERM CURRENT USE OF INSULIN (HCC): ICD-10-CM

## 2024-05-29 LAB
ALBUMIN SERPL-MCNC: 4.3 G/DL (ref 3.2–4.8)
ALBUMIN/GLOB SERPL: 1.5 {RATIO} (ref 1–2)
ALP LIVER SERPL-CCNC: 128 U/L
ALT SERPL-CCNC: 27 U/L
ANION GAP SERPL CALC-SCNC: 8 MMOL/L (ref 0–18)
AST SERPL-CCNC: 16 U/L (ref ?–34)
BILIRUB SERPL-MCNC: 0.7 MG/DL (ref 0.3–1.2)
BUN BLD-MCNC: 11 MG/DL (ref 9–23)
BUN/CREAT SERPL: 13.1 (ref 10–20)
CALCIUM BLD-MCNC: 9.3 MG/DL (ref 8.7–10.4)
CHLORIDE SERPL-SCNC: 106 MMOL/L (ref 98–112)
CHOLEST SERPL-MCNC: 157 MG/DL (ref ?–200)
CO2 SERPL-SCNC: 26 MMOL/L (ref 21–32)
CREAT BLD-MCNC: 0.84 MG/DL
EGFRCR SERPLBLD CKD-EPI 2021: 107 ML/MIN/1.73M2 (ref 60–?)
EST. AVERAGE GLUCOSE BLD GHB EST-MCNC: 186 MG/DL (ref 68–126)
FASTING PATIENT LIPID ANSWER: NO
FASTING STATUS PATIENT QL REPORTED: NO
GLOBULIN PLAS-MCNC: 2.8 G/DL (ref 2–3.5)
GLUCOSE BLD-MCNC: 187 MG/DL (ref 70–99)
HBA1C MFR BLD: 8.1 % (ref ?–5.7)
HDLC SERPL-MCNC: 41 MG/DL (ref 40–59)
LDLC SERPL CALC-MCNC: 79 MG/DL (ref ?–100)
NONHDLC SERPL-MCNC: 116 MG/DL (ref ?–130)
OSMOLALITY SERPL CALC.SUM OF ELEC: 294 MOSM/KG (ref 275–295)
POTASSIUM SERPL-SCNC: 3.8 MMOL/L (ref 3.5–5.1)
PROT SERPL-MCNC: 7.1 G/DL (ref 5.7–8.2)
SODIUM SERPL-SCNC: 140 MMOL/L (ref 136–145)
TRIGL SERPL-MCNC: 222 MG/DL (ref 30–149)
VLDLC SERPL CALC-MCNC: 35 MG/DL (ref 0–30)

## 2024-05-29 PROCEDURE — 80061 LIPID PANEL: CPT

## 2024-05-29 PROCEDURE — 83036 HEMOGLOBIN GLYCOSYLATED A1C: CPT

## 2024-05-29 PROCEDURE — 80053 COMPREHEN METABOLIC PANEL: CPT

## 2024-05-29 PROCEDURE — 36415 COLL VENOUS BLD VENIPUNCTURE: CPT

## 2024-06-05 NOTE — PROGRESS NOTES
Due to COVID-19 ACTION PLAN, the patient's office visit was converted to a video visit. Please note that the following visit was completed using two-way, real-time interactive audio and video communication.  Time Spent:  17    min    Rajeev Lazo is a 49 year old presenting today for type 2 diabetes management.   Primary care physician: Og Zavaleta MD  Most recent A1C 8.1% ( last A1C 9.6%)  Last Appt w me:    A1C reflects control since starting basal insulin       Recent labs - reviewed today   Has been taking basal insulin daily -     Feels he is doing well and admits that his energy levels are improved. Wishes he would have started basal insulin years ago .. .      Testing usually  1 x daily ( fasting )     Occasionally later in day after work if  he missed a meal or ate very little   Fasting trends 123 - 150 mg/dl      Later in day trends: 98 mg/ dl (lowest) but usually 160 s         Works as  medical transporter, driving in car  all day , erratic hours    Time barrier, cost barrier of DM meds  Did investigate cost of Ozempic w his plan : Cost was $870 despite activating co pay card -           Diabetes History:  Type 2 DM ~    Patient has not had hospitalizations for blood sugar issues  denies any history of pancreatitis      Previous DM therapies:  Metformin 1000mg ; stopped  due to diarrhea     Current DM Regimen:  Glipizide ER 5mg/2.5 mg  : 1 tab  daily in AM (7.5mg daily)   Metformin ER 500m tab daily   Tresiba flex touch U 100: 12 units once daily       HGBA1C:    Lab Results   Component Value Date    A1C 8.1 (H) 2024    A1C 9.6 (H) 2024    A1C 8.8 (H) 2023     (H) 2024       Lab Results   Component Value Date    CHOLEST 157 2024    CHOLEST 179 2024    TRIG 222 (H) 2024    TRIG 195 (H) 2024    HDL 41 2024    HDL 43 2024    LDL 79 2024     (H) 2024     Lab Results   Component Value Date     MICROALBCREA 5.7 02/16/2024    MICROALBCREA 9.0 04/20/2023      Lab Results   Component Value Date    CREATSERUM 0.84 05/29/2024    CREATSERUM 1.02 02/16/2024    EGFRCR 107 05/29/2024    EGFRCR 90 02/16/2024     Lab Results   Component Value Date    AST 16 05/29/2024    AST 20 02/16/2024    ALT 27 05/29/2024    ALT 52 (H) 02/16/2024       Lab Results   Component Value Date    TSH 3.271 02/16/2024    TSH 3.630 04/20/2023    T4F 0.9 04/20/2023             DM Complications:  Microvascular:   Neuropathy: yes  Retinopathy: no  Nephropathy: no    Macrovascular:  PVD: no  CAD: no  Stroke/CVA: no        Modifying factors:  Medication adherence: yes   Barriers: cost concerns (high deductible insurance)    Recent steroids, illness or infections ( past 3m): no     Allergies: Penicillins    Past Medical History:    Diabetes (HCC)    Hyperlipidemia    Obesity     Past Surgical History:   Procedure Laterality Date    Other surgical history      17 teeth removed, has dentures full upper and partial lower     Social History     Socioeconomic History    Marital status:    Tobacco Use    Smoking status: Never    Smokeless tobacco: Never   Substance and Sexual Activity    Alcohol use: Yes     Alcohol/week: 0.0 standard drinks of alcohol     Comment: social    Drug use: No   Other Topics Concern    Caffeine Concern Yes     Comment: diet coke; 4 large cups from [employer]    Exercise No     Comment: only at work    Seat Belt Yes     Family History   Problem Relation Age of Onset    Heart Disorder Father         defribrillator    Diabetes Father     No Known Problems Mother     Cancer Maternal Grandmother         breast    Diabetes Paternal Grandmother      Current Medication List:   Current Outpatient Medications   Medication Sig Dispense Refill    pioglitazone 15 MG Oral Tab Take 1 tablet (15 mg total) by mouth daily. 30 tablet 1    Insulin Pen Needle (PEN NEEDLES) 32G X 4 MM Does not apply Misc 1 Device daily. 100 each 1     glipiZIDE ER 5 MG Oral Tablet 24 Hr Take 1 tablet (5 mg total) by mouth every morning. 90 tablet 0    metFORMIN  MG Oral Tablet 24 Hr Take 1 tablet (500 mg total) by mouth daily. 90 tablet 1    atorvastatin 10 MG Oral Tab Take 1 tablet (10 mg total) by mouth nightly. 90 tablet 3    insulin degludec (TRESIBA FLEXTOUCH) 100 UNIT/ML Subcutaneous Solution Pen-injector Uses up to 20 units daily as directed with dose titration 15 mL 0    Omega-3 Fatty Acids (FISH OIL) 1200 MG Oral Cap Take by mouth.             DM associated review of  symptoms:   Endocrine: Polyuria, polyphagia, polydipsia: no  Neurological: Paresthesias: yes   HEENT: Blurred vision: no  Skin: no rash or wounds  Hematological: Hypoglycemia: no      Review of Systems     LUNGS: denies shortness of breath   CARDIOVASCULAR: denies chest pain  GI: denies abdominal pain, nausea or diarrhea   : denies dysuria    Physical exam:  There were no vitals taken for this visit.  There is no height or weight on file to calculate BMI.    Physical Exam     Constitutional: Normal appearance   Cardiovascular: Not assessed today   Pulmonary/Chest: Effort normal  Neurological: Alert and oriented .   Psychiatric: Normal mood and affect.         Assessment/Plan:      Dyslipidemia:   Last  labs 5-2024   Continue atorvastatin 10 mg once daily   LDL has improved since 2-2024   Cannot tolerate higher dose atorva rx.  Will monitori and reminded to continue focus on healthier eating       Obesity   Continue focus on healthier eating and  avoiding regular soda  Cost barrier w GLP1 rx ( >$800)       Type 2 diabetes mellitus with hyperglycemia, without long-term current use of insulin ([facility])    A1C: 8.1% ( last A1C 9.6%)   Last DM center weight: 285 lb   Diabetes control is improving but still sub optimal       Reviewed with patient health impact associated with high glucose trends and the importance of better glucose control to prevent onset /progression of DM complications.    Despite DM /ASCVD benefit, cost barriers w GLP1/SGLT2 rx (has high deductible insurance )   ~would not qualify for patient assistance since he has commercial insurance       Discussed TZD as clinical studies , Pioglitazone reduces the risk of MACE  Decrease Glipizide XL  7.5 mg dose --> 5mg  once daily in AM         Continue      Metformin XR 500m tab daily in AM (cannot tolerate higher dose)   Tresiba U 100 flex touch:  12 units once daily     Advised to call me if trends are still over 160 mg/dl in AM and over 180 mg/dl after meals        Reviewed  clinical significance of A1c, adverse effects of suboptimal glucose control, and goals of therapy   Reviewed the A1C test, what the value reflects and the goal for the patient.   Reminded pt on A1C and blood sugar targets (Fasting < 130 and post prandial <180 ) and complications associated with hyperglycemia and uncontrolled DM (on AVS)   Recommended SMBG 2- 3 x daily   Reviewed s/s and treatment of hypoglycemia (on AVS) and patient handout provided today     Continue with lifestyle modifications since they have positive impact on diabetes/blood sugars/health (portion control, physical activity, weight loss)   Reinforced timing and adherence with medication, self-monitoring of blood glucose and routine follow up  Prefers telehealth appts due to his work schedule   2 week meter check in  -   Telehealth appt in  in 3 m  but recommended to contact DM clinic sooner if questions or concerns.    The patient indicates understanding of these issues and agrees to the plan.      Orders Placed This Encounter    Hemoglobin A1C     Standing Status:   Future     Standing Expiration Date:   2025    pioglitazone 15 MG Oral Tab     Sig: Take 1 tablet (15 mg total) by mouth daily.     Dispense:  30 tablet     Refill:  1    Insulin Pen Needle (PEN NEEDLES) 32G X 4 MM Does not apply Misc     Si Device daily.     Dispense:  100 each     Refill:  1     DM quality  A1C/Blood  pressure: as reported above   Nephropathy screening -  continue ace /arb rx.   LIPID screenin  Atorvastatin rx.   Last dilated eye exam: Last Dilated Eye Exam: 24   Exam shows retinopathy? Eye Exam shows Diabetic Retinopathy?: No  Last diabetic foot exam: Last Foot Exam: 23        This has been done in good steve to provide continuity of care in the best interest of the provider-patient relationship, due to the on-going public health crisis/national emergency and because of restrictions of visitation.  There are limitations of this visit as no or only very limited physical exam could be performed.  Every conscious effort was taken to allow for sufficient and adequate time.  This billing visit was spent on reviewing blood sugar trends, DM related labs, medications and decision making.  Appropriate medical decision-making and tests are ordered as detailed in the plan of care above.      Rajeev Lazo understands phone or video evaluation is not a substitute for face-to-face examination or emergency care. Patient advised to go to ER or call 911 for worsening symptoms or acute distress.     Sofie Erickson, APRN

## 2024-06-06 ENCOUNTER — TELEMEDICINE (OUTPATIENT)
Dept: ENDOCRINOLOGY CLINIC | Facility: CLINIC | Age: 49
End: 2024-06-06
Payer: COMMERCIAL

## 2024-06-06 DIAGNOSIS — E66.01 MORBID OBESITY (HCC): ICD-10-CM

## 2024-06-06 DIAGNOSIS — E78.2 MIXED HYPERLIPIDEMIA: ICD-10-CM

## 2024-06-06 DIAGNOSIS — E11.65 TYPE 2 DIABETES MELLITUS WITH HYPERGLYCEMIA, WITHOUT LONG-TERM CURRENT USE OF INSULIN (HCC): Primary | ICD-10-CM

## 2024-06-06 PROCEDURE — 99214 OFFICE O/P EST MOD 30 MIN: CPT | Performed by: NURSE PRACTITIONER

## 2024-06-06 RX ORDER — PEN NEEDLE, DIABETIC 30 GX3/16"
1 NEEDLE, DISPOSABLE MISCELLANEOUS DAILY
Qty: 100 EACH | Refills: 1 | Status: SHIPPED | OUTPATIENT
Start: 2024-06-06

## 2024-06-06 RX ORDER — PIOGLITAZONEHYDROCHLORIDE 15 MG/1
15 TABLET ORAL DAILY
Qty: 30 TABLET | Refills: 1 | Status: SHIPPED | OUTPATIENT
Start: 2024-06-06

## 2024-06-06 NOTE — PATIENT INSTRUCTIONS
Your cholesterol level LDL (bad fat) has improved   Continue Atorvastatin rx       Please start Pioglitazone 15mg once daily   This medication helps the insulin in your body work more effectively     The most common side effect can be swelling in legs - but not always. If you notice any leg swelling please call me     Decrease Glipizide ER 5mg daily only   Stop the glipizide 2.5mg tablet     Continue    Metformin ER 500m tab daily   Tresiba flex touch U 100: 12 units once daily     Please increase testing to 3 x daily since we are changing medications     Fluctuations in blood sugars are best detected by testing your sugar with your meter.   In order for me to determine any patterns in your blood sugars, you will need to test your blood sugar 1- 2 times daily     It would be best to change up the times of day that you are testing your sugar.   Always test before breakfast (fasting) and then alternate testing blood sugar 1-2 hours after your meals.     American Diabetes Association: blood sugar targets:     Fasting blood sugar (before breakfast) Target:    (ideally less than 110)  2 hours after eating less than 180 (ideally less than  150 )     Call for blood sugars less than  75 or greater than  200 more than 2 times in a week         Watch for low blood sugars: (less than 70 )    Treatment of Low Blood Glucose Action Plan  1. Check blood glucose to be sure that it is low. You cannot  always go by symptoms or how you feel. If in doubt, treat your low blood glucose anyway.  Rule of 15 :     2. Take 15 grams of carbohydrate (carb). Here are some choices:    4 oz. regular fruit juice  3-4 glucose tablets  6 oz. regular soda   7-8 jelly beans    3. Recheck blood glucose after 10-15 minutes. If blood glucose is still low (less than 70 mg/dl) repeat the treatment (step 2).    4. If your next meal is more than one hour away, eat a small snack.    5. If you’re not sure what caused your low blood glucose, call your  healthcare provider.    6. Always check your blood glucose before you drive

## 2024-06-24 DIAGNOSIS — E11.65 TYPE 2 DIABETES MELLITUS WITH HYPERGLYCEMIA, WITHOUT LONG-TERM CURRENT USE OF INSULIN (HCC): ICD-10-CM

## 2024-06-25 RX ORDER — PIOGLITAZONEHYDROCHLORIDE 15 MG/1
15 TABLET ORAL DAILY
Qty: 30 TABLET | Refills: 1 | Status: SHIPPED | OUTPATIENT
Start: 2024-06-25

## 2024-06-25 RX ORDER — ATORVASTATIN CALCIUM 10 MG/1
10 TABLET, FILM COATED ORAL NIGHTLY
Qty: 90 TABLET | Refills: 3 | Status: SHIPPED | OUTPATIENT
Start: 2024-06-25

## 2024-06-25 RX ORDER — INSULIN DEGLUDEC 100 U/ML
INJECTION, SOLUTION SUBCUTANEOUS
Qty: 15 ML | Refills: 0 | Status: SHIPPED | OUTPATIENT
Start: 2024-06-25

## 2024-06-25 NOTE — TELEPHONE ENCOUNTER
Requested Prescriptions     Pending Prescriptions Disp Refills    insulin degludec (TRESIBA FLEXTOUCH) 100 UNIT/ML Subcutaneous Solution Pen-injector 15 mL 0     Sig: Uses up to 20 units daily as directed with dose titration    atorvastatin 10 MG Oral Tab 90 tablet 3     Sig: Take 1 tablet (10 mg total) by mouth nightly.    pioglitazone 15 MG Oral Tab 30 tablet 1     Sig: Take 1 tablet (15 mg total) by mouth daily.     Your appointments       Date & Time Appointment Department ([employer])    Sep 19, 2024 7:30 AM CDT Video Visit with Sofie Erickson APRN [facility], [address] [place] [address] ([facility] [place])    Please verify your telehealth insurance benefits prior to your appointment.    You must be in the [facility] during the virtual visit.     Please use the [employer] and launch the video visit 10 minutes prior to your scheduled appointment time to ensure your camera and microphone are working properly. Once the video visit has started you will be placed in a waiting room until the provider begins the visit.     You will receive an email confirmation with instructions.  If you have questions, call your doctor's office directly.    If you are having issues or need to use a desktop/laptop, please follow the below steps:        1.       Close out all other open apps (could be competing for audio resources)  2.       Disable Bluetooth  3.       Reboot mobile device before joining the video  4.       Come off Wi-Fi and switch over to Data    Please see our Video Visit Tip Sheet if you need additional assistance.     If you believe this is an emergency, please dial 911 immediately.                [facility]ING[facility], [address] [place] [address]  [facility] [place]  [address] [place] [address] [id number] [phone]          Last A1c value was 8.1% done 5/29/2024.    Refill 2/26/24, 4/3/24, 6/6/24  LOV 6/6/24

## 2024-07-23 DIAGNOSIS — E11.65 TYPE 2 DIABETES MELLITUS WITH HYPERGLYCEMIA, WITHOUT LONG-TERM CURRENT USE OF INSULIN (HCC): ICD-10-CM

## 2024-07-23 NOTE — TELEPHONE ENCOUNTER
Requested Prescriptions     Pending Prescriptions Disp Refills    metFORMIN  MG Oral Tablet 24 Hr 90 tablet 1     Sig: Take 1 tablet (500 mg total) by mouth daily.    glipiZIDE ER 5 MG Oral Tablet 24 Hr 90 tablet 0     Sig: Take 1 tablet (5 mg total) by mouth every morning.    pioglitazone 15 MG Oral Tab 30 tablet 1     Sig: Take 1 tablet (15 mg total) by mouth daily.     Future Appointments   Date Time Provider Department Center   9/19/2024  7:30 AM Sofie Erickson APRN [facility]     Last A1c value was 8.1% done 5/29/2024.  Refill 4/4/24  LOV 6/6/24

## 2024-07-24 RX ORDER — METFORMIN HYDROCHLORIDE 500 MG/1
500 TABLET, EXTENDED RELEASE ORAL DAILY
Qty: 90 TABLET | Refills: 1 | Status: SHIPPED | OUTPATIENT
Start: 2024-07-24

## 2024-07-24 RX ORDER — GLIPIZIDE 5 MG/1
5 TABLET, FILM COATED, EXTENDED RELEASE ORAL EVERY MORNING
Qty: 90 TABLET | Refills: 0 | Status: SHIPPED | OUTPATIENT
Start: 2024-07-24

## 2024-07-24 RX ORDER — PIOGLITAZONEHYDROCHLORIDE 15 MG/1
15 TABLET ORAL DAILY
Qty: 30 TABLET | Refills: 1 | Status: SHIPPED | OUTPATIENT
Start: 2024-07-24

## 2024-09-17 ENCOUNTER — LAB ENCOUNTER (OUTPATIENT)
Dept: LAB | Age: 49
End: 2024-09-17
Attending: NURSE PRACTITIONER
Payer: COMMERCIAL

## 2024-09-17 DIAGNOSIS — E11.65 TYPE 2 DIABETES MELLITUS WITH HYPERGLYCEMIA, WITHOUT LONG-TERM CURRENT USE OF INSULIN (HCC): ICD-10-CM

## 2024-09-17 LAB
EST. AVERAGE GLUCOSE BLD GHB EST-MCNC: 197 MG/DL (ref 68–126)
HBA1C MFR BLD: 8.5 % (ref ?–5.7)

## 2024-09-17 PROCEDURE — 83036 HEMOGLOBIN GLYCOSYLATED A1C: CPT

## 2024-09-17 PROCEDURE — 36415 COLL VENOUS BLD VENIPUNCTURE: CPT

## 2024-09-18 NOTE — PROGRESS NOTES
Telehealth appointment: The patient's office visit was converted to a video visit. Please note that the following visit was completed using two-way, real-time interactive audio and video communication.  Time Spent:  18    min    Rajeev Lazo is a 49 year old presenting today for type 2 diabetes management.   Primary care physician: Og Zavaleta MD  Last Appt w me:    Most recent A1C 8.5% ( last A1C 8.1%)  At last DM appt, added actos 15mg ( and decreased Glipizide to 5mg from 7.5mg)   Denies LE swelling or weight gain       Despite adding pioglitazone 15mg rx to regimen, trends have worsened. Did decrease Glipizide rx dose from 7.5mg to 5mg daily dose    Did message me late  about late PM cravings but never informed me of BG readings to see if med adjustments were needed.     Work schedule is erratic; eatling late.largest meal is dinner and can be as late as 10 pm     Testing usually  1 x daily ( fasting )   Denies any hypoglycemia   Occasionally later in day after work if  he missed a meal or ate very little   Fasting trends never below 150 m g/dl  but can be as high as 220 mg/dl           Works as  medical transporter, driving in car  all day ,    Time barrier, cost barrier of DM meds  Did investigate cost of Ozempic w his plan : Cost was $870 despite activating co pay card -           Diabetes History:  Type 2 DM ~    Patient has not had hospitalizations for blood sugar issues  denies any history of pancreatitis      Previous DM therapies:  Metformin 1000mg ; stopped  due to diarrhea     Current DM Regimen:  Glipizide ER 5mg 1 tab  daily in AM   Metformin ER 500m tab daily   Tresiba flex touch U 100: 12 units once daily   Pioglitazone 15mg once daily     HGBA1C:    Lab Results   Component Value Date    A1C 8.5 (H) 2024    A1C 8.1 (H) 2024    A1C 9.6 (H) 2024     (H) 2024       Lab Results   Component Value Date    CHOLEST 157 2024    CHOLEST 179  02/16/2024    TRIG 222 (H) 05/29/2024    TRIG 195 (H) 02/16/2024    HDL 41 05/29/2024    HDL 43 02/16/2024    LDL 79 05/29/2024     (H) 02/16/2024     Lab Results   Component Value Date    MICROALBCREA 5.7 02/16/2024    MICROALBCREA 9.0 04/20/2023      Lab Results   Component Value Date    CREATSERUM 0.84 05/29/2024    CREATSERUM 1.02 02/16/2024    EGFRCR 107 05/29/2024    EGFRCR 90 02/16/2024     Lab Results   Component Value Date    AST 16 05/29/2024    AST 20 02/16/2024    ALT 27 05/29/2024    ALT 52 (H) 02/16/2024       Lab Results   Component Value Date    TSH 3.271 02/16/2024    TSH 3.630 04/20/2023    T4F 0.9 04/20/2023             DM Complications:  Microvascular:   Neuropathy: yes  Retinopathy: no  Nephropathy: no    Macrovascular:  PVD: no  CAD: no  Stroke/CVA: no        Modifying factors:  Medication adherence: yes   Barriers: cost concerns (high deductible insurance)    Recent steroids, illness or infections ( past 3m): no     Allergies: Penicillins    Past Medical History:    Diabetes (HCC)    Hyperlipidemia    Obesity     Past Surgical History:   Procedure Laterality Date    Other surgical history      17 teeth removed, has dentures full upper and partial lower     Social History     Socioeconomic History    Marital status:    Tobacco Use    Smoking status: Never    Smokeless tobacco: Never   Substance and Sexual Activity    Alcohol use: Yes     Alcohol/week: 0.0 standard drinks of alcohol     Comment: social    Drug use: No   Other Topics Concern    Caffeine Concern Yes     Comment: diet coke; 4 large cups from [employer]    Exercise No     Comment: only at work    Seat Belt Yes     Family History   Problem Relation Age of Onset    Heart Disorder Father         defribrillator    Diabetes Father     No Known Problems Mother     Cancer Maternal Grandmother         breast    Diabetes Paternal Grandmother      Current Medication List:   Current Outpatient Medications   Medication Sig  Dispense Refill    glipiZIDE ER 2.5 MG Oral Tablet 24 Hr Take with 5mg glipizide for total daily dose of 7.5mg 90 tablet 1    Insulin Pen Needle (PEN NEEDLES) 32G X 4 MM Does not apply Misc 1 Device daily. 100 each 2    atorvastatin 10 MG Oral Tab Take 1 tablet (10 mg total) by mouth nightly. 90 tablet 3    metFORMIN  MG Oral Tablet 24 Hr Take 1 tablet (500 mg total) by mouth daily. 90 tablet 1    glipiZIDE ER 5 MG Oral Tablet 24 Hr Take 1 tablet (5 mg total) by mouth every morning. 90 tablet 0    pioglitazone 15 MG Oral Tab Take 1 tablet (15 mg total) by mouth daily. 30 tablet 1    insulin degludec (TRESIBA FLEXTOUCH) 100 UNIT/ML Subcutaneous Solution Pen-injector Uses up to 20 units daily as directed with dose titration 15 mL 0    Omega-3 Fatty Acids (FISH OIL) 1200 MG Oral Cap Take by mouth.             DM associated review of  symptoms:   Endocrine: Polyuria, polyphagia, polydipsia: no  Neurological: Paresthesias: yes   HEENT: Blurred vision: no  Skin: no rash or wounds  Hematological: Hypoglycemia: no      Review of Systems     LUNGS: denies shortness of breath   CARDIOVASCULAR: denies chest pain  GI: denies abdominal pain, nausea or diarrhea   : denies dysuria    Physical exam:  There were no vitals taken for this visit.  There is no height or weight on file to calculate BMI.    Physical Exam     Constitutional: Normal appearance   Cardiovascular: Not assessed today   Pulmonary/Chest: Effort normal  Neurological: Alert and oriented .   Psychiatric: Normal mood and affect.         Assessment/Plan:      Dyslipidemia:   Last  labs 5-2024   Continue atorvastatin 10 mg once daily   Cannot tolerate higher dose atorva rx.  Needs ongoing  monitoring       Obesity   Continue focus on healthier eating and  avoiding regular soda  Cost barrier w GLP1 rx ( >$800)       Type 2 diabetes mellitus with hyperglycemia, without long-term current use of insulin ([facility])    A1C: 8.5% ( last A1C 8.1%)   Last DM center weight:  285 lb   Diabetes control is increased      Reviewed with patient health impact associated with high glucose trends and the importance of better glucose control to prevent onset /progression of DM complications.   Despite DM /ASCVD benefit, cost barriers w GLP1/SGLT2 rx (has high deductible insurance )   ~would not qualify for patient assistance since he has commercial insurance     Restart Glipizide XL 2.5mg and dose at dinner meal -   Continue Glipizide 5mg in AM w breakfast   Increase Tresiba 12--> 15 units once daily   Continue    Pioglitazone 15mg once daily     Metformin XR 500m tab daily in AM (cannot tolerate higher dose)     Reminded to carry glucose with him at all times while driving car- info on AVS but discussed viable options for  rule of 15     Advised to call me if trends are still over 160 mg/dl in AM and over 180 mg/dl after meals        Reviewed  clinical significance of A1c, adverse effects of suboptimal glucose control, and goals of therapy   Reviewed the A1C test, what the value reflects and the goal for the patient.   Reminded pt on A1C and blood sugar targets (Fasting < 130 and post prandial <180 ) and complications associated with hyperglycemia and uncontrolled DM (on AVS)   Recommended SMBG 2- 3 x daily   Reviewed s/s and treatment of hypoglycemia (on AVS) and patient handout provided today   Glucagon rx cost barrier     Continue with lifestyle modifications since they have positive impact on diabetes/blood sugars/health (portion control, physical activity, weight loss)   Reinforced timing and adherence with medication, self-monitoring of blood glucose and routine follow up  Prefers telehealth appts due to his work schedule   2 week meter check in  -   A1C recheck 6 weeks   Telehealth appt in  in 3 m  but recommended to contact DM clinic sooner if questions or concerns.    The patient indicates understanding of these issues and agrees to the plan.      Orders Placed This Encounter     Hemoglobin A1C     Standing Status:   Future     Standing Expiration Date:   2025    glipiZIDE ER 2.5 MG Oral Tablet 24 Hr     Sig: Take with 5mg glipizide for total daily dose of 7.5mg     Dispense:  90 tablet     Refill:  1    Insulin Pen Needle (PEN NEEDLES) 32G X 4 MM Does not apply Misc     Si Device daily.     Dispense:  100 each     Refill:  2    atorvastatin 10 MG Oral Tab     Sig: Take 1 tablet (10 mg total) by mouth nightly.     Dispense:  90 tablet     Refill:  3     Diabetes complications & risks surveillance:     A1C/Blood pressure: as reported    Nephropathy screening -  continue ace /arb rx.   LIPID screenin  Atorvastatin rx.   Last dilated eye exam: Last Dilated Eye Exam: 24   Exam shows retinopathy? Eye Exam shows Diabetic Retinopathy?: No  Last diabetic foot exam: No data recorded        This has been done in good steve to provide continuity of care in the best interest of the provider-patient relationship, due to the on-going public health crisis/national emergency and because of restrictions of visitation.  There are limitations of this visit as no or only very limited physical exam could be performed.  Every conscious effort was taken to allow for sufficient and adequate time.  This billing visit was spent on reviewing blood sugar trends, DM related labs, medications and decision making.  Appropriate medical decision-making and tests are ordered as detailed in the plan of care above.      Rajeev Lazo understands phone or video evaluation is not a substitute for face-to-face examination or emergency care. Patient advised to go to ER or call 911 for worsening symptoms or acute distress.     Sofie Erickson, APRN

## 2024-09-19 ENCOUNTER — TELEMEDICINE (OUTPATIENT)
Dept: ENDOCRINOLOGY CLINIC | Facility: CLINIC | Age: 49
End: 2024-09-19
Payer: COMMERCIAL

## 2024-09-19 DIAGNOSIS — E78.2 MIXED HYPERLIPIDEMIA: ICD-10-CM

## 2024-09-19 DIAGNOSIS — E66.01 MORBID OBESITY (HCC): ICD-10-CM

## 2024-09-19 DIAGNOSIS — E11.65 TYPE 2 DIABETES MELLITUS WITH HYPERGLYCEMIA, WITHOUT LONG-TERM CURRENT USE OF INSULIN (HCC): Primary | ICD-10-CM

## 2024-09-19 PROCEDURE — 99214 OFFICE O/P EST MOD 30 MIN: CPT | Performed by: NURSE PRACTITIONER

## 2024-09-19 RX ORDER — PEN NEEDLE, DIABETIC 30 GX3/16"
1 NEEDLE, DISPOSABLE MISCELLANEOUS DAILY
Qty: 100 EACH | Refills: 2 | Status: SHIPPED | OUTPATIENT
Start: 2024-09-19

## 2024-09-19 RX ORDER — GLIPIZIDE 2.5 MG/1
TABLET, EXTENDED RELEASE ORAL
Qty: 90 TABLET | Refills: 1 | Status: SHIPPED | OUTPATIENT
Start: 2024-09-19

## 2024-09-19 RX ORDER — ATORVASTATIN CALCIUM 10 MG/1
10 TABLET, FILM COATED ORAL NIGHTLY
Qty: 90 TABLET | Refills: 3 | Status: SHIPPED | OUTPATIENT
Start: 2024-09-19

## 2024-09-19 NOTE — PATIENT INSTRUCTIONS
A1C 8.5% ( last A1C 8.1%)     Repeat the lab by 2024       Increase Glipizide: dose gipizide  ER 5mg 1 tab  daily in AM and start dosing the 2.5mg once daily with dinner       Increase Tresiba 15 units once daily     Continue     Metformin ER 500m tab daily   Pioglitazone 15mg once daily       Fluctuations in blood sugars are best detected by testing your sugar with your meter.   In order for me to determine any patterns in your blood sugars, you will need to test your blood sugar 1- 2 times daily     It would be best to change up the times of day that you are testing your sugar.   Always test before breakfast (fasting) and then alternate testing blood sugar 1-2 hours after your meals.     American Diabetes Association: blood sugar targets:     Fasting blood sugar (before breakfast) Target:    (ideally less than 110)  2 hours after eating less than 180 (ideally less than  150 )     Call for blood sugars less than  75 or greater than  200 more than 2 times in a week     Watch for low blood sugars: (less than 70 )    Treatment of Low Blood Glucose Action Plan  1. Check blood glucose to be sure that it is low. You cannot  always go by symptoms or how you feel. If in doubt, treat your low blood glucose anyway.  Rule of 15 :     2. Take 15 grams of carbohydrate (carb). Here are some choices:    4 oz. regular fruit juice  3-4 glucose tablets  6 oz. regular soda   7-8 jelly beans    3. Recheck blood glucose after 10-15 minutes. If blood glucose is still low (less than 70 mg/dl) repeat the treatment (step 2).    4. If your next meal is more than one hour away, eat a small snack.    5. If you’re not sure what caused your low blood glucose, call your healthcare provider.    6. Always check your blood glucose before you drive       To treat a low, I recommend you carry with you easy, pre-portioned treatment for low blood sugars that are 15G of carbs:   - Children sized squeeze pouch applesauce (high fiber  + carbs help prevent too high of a spike)  - Small children's sized juicebox- 15g carb --> 4oz juice box  - Glucose tablets from [employer], you can find them near diabetes supplies --> Note, you will need to eat 3-4 tablets to get to 15g of carbs  - Children sized fruit snack pack- look for one with 15 grams of total carbohydrate    AVOID complex carbs, or foods that contain fats along with carbs (like chocolate) can slow the absorption of glucose and should NOT be used to treat an emergency low

## 2024-10-14 DIAGNOSIS — E11.65 TYPE 2 DIABETES MELLITUS WITH HYPERGLYCEMIA, WITHOUT LONG-TERM CURRENT USE OF INSULIN (HCC): ICD-10-CM

## 2024-10-14 RX ORDER — PIOGLITAZONEHYDROCHLORIDE 15 MG/1
15 TABLET ORAL DAILY
Qty: 30 TABLET | Refills: 1 | Status: SHIPPED | OUTPATIENT
Start: 2024-10-14

## 2024-10-14 RX ORDER — GLIPIZIDE 5 MG/1
5 TABLET, FILM COATED, EXTENDED RELEASE ORAL EVERY MORNING
Qty: 90 TABLET | Refills: 0 | Status: SHIPPED | OUTPATIENT
Start: 2024-10-14

## 2024-10-14 RX ORDER — METFORMIN HCL 500 MG
500 TABLET, EXTENDED RELEASE 24 HR ORAL DAILY
Qty: 90 TABLET | Refills: 1 | Status: SHIPPED | OUTPATIENT
Start: 2024-10-14

## 2024-10-14 RX ORDER — INSULIN DEGLUDEC 100 U/ML
INJECTION, SOLUTION SUBCUTANEOUS
Qty: 15 ML | Refills: 0 | Status: SHIPPED | OUTPATIENT
Start: 2024-10-14

## 2024-10-14 NOTE — TELEPHONE ENCOUNTER
Requested Prescriptions     Pending Prescriptions Disp Refills    insulin degludec (TRESIBA FLEXTOUCH) 100 UNIT/ML Subcutaneous Solution Pen-injector 15 mL 0     Sig: Uses up to 20 units daily as directed with dose titration    metFORMIN  MG Oral Tablet 24 Hr 90 tablet 1     Sig: Take 1 tablet (500 mg total) by mouth daily.    glipiZIDE ER 5 MG Oral Tablet 24 Hr 90 tablet 0     Sig: Take 1 tablet (5 mg total) by mouth every morning.    pioglitazone 15 MG Oral Tab 30 tablet 1     Sig: Take 1 tablet (15 mg total) by mouth daily.     Future Appointments   Date Time Provider Department Center   2/6/2025  7:30 AM Sofie Erickson, GISELE [facility]     Last A1c value was 8.5% done 9/17/2024.  Refill 9/19/24 [facility]   LOV 9/19/24 [facility]

## 2024-10-31 ENCOUNTER — LAB ENCOUNTER (OUTPATIENT)
Dept: LAB | Age: 49
End: 2024-10-31
Attending: NURSE PRACTITIONER
Payer: COMMERCIAL

## 2024-10-31 DIAGNOSIS — E11.65 TYPE 2 DIABETES MELLITUS WITH HYPERGLYCEMIA, WITHOUT LONG-TERM CURRENT USE OF INSULIN (HCC): ICD-10-CM

## 2024-10-31 LAB
EST. AVERAGE GLUCOSE BLD GHB EST-MCNC: 189 MG/DL (ref 68–126)
HBA1C MFR BLD: 8.2 % (ref ?–5.7)

## 2024-10-31 PROCEDURE — 36415 COLL VENOUS BLD VENIPUNCTURE: CPT

## 2024-10-31 PROCEDURE — 83036 HEMOGLOBIN GLYCOSYLATED A1C: CPT

## 2024-11-01 DIAGNOSIS — E11.65 TYPE 2 DIABETES MELLITUS WITH HYPERGLYCEMIA, WITHOUT LONG-TERM CURRENT USE OF INSULIN (HCC): Primary | ICD-10-CM

## 2024-12-05 DIAGNOSIS — E11.65 TYPE 2 DIABETES MELLITUS WITH HYPERGLYCEMIA, WITHOUT LONG-TERM CURRENT USE OF INSULIN (HCC): ICD-10-CM

## 2024-12-05 RX ORDER — PIOGLITAZONE 15 MG/1
15 TABLET ORAL DAILY
Qty: 30 TABLET | Refills: 1 | Status: SHIPPED | OUTPATIENT
Start: 2024-12-05

## 2024-12-05 NOTE — TELEPHONE ENCOUNTER
Requested Prescriptions     Pending Prescriptions Disp Refills    pioglitazone 15 MG Oral Tab 30 tablet 1     Sig: Take 1 tablet (15 mg total) by mouth daily.     Future Appointments   Date Time Provider Department Center   2/6/2025  7:30 AM Sofie Erickson APRN [facility]     Last A1c value was 8.2% done 10/31/2024.  Refill 10/14/24 [facility]   LOV 9/19/24 [facility]

## 2024-12-26 DIAGNOSIS — E11.65 TYPE 2 DIABETES MELLITUS WITH HYPERGLYCEMIA, WITHOUT LONG-TERM CURRENT USE OF INSULIN (HCC): ICD-10-CM

## 2024-12-26 RX ORDER — ATORVASTATIN CALCIUM 10 MG/1
10 TABLET, FILM COATED ORAL NIGHTLY
Qty: 90 TABLET | Refills: 3 | Status: SHIPPED | OUTPATIENT
Start: 2024-12-26

## 2024-12-26 RX ORDER — PIOGLITAZONE 15 MG/1
15 TABLET ORAL DAILY
Qty: 90 TABLET | Refills: 0 | Status: SHIPPED | OUTPATIENT
Start: 2024-12-26

## 2024-12-26 RX ORDER — PEN NEEDLE, DIABETIC 30 GX3/16"
1 NEEDLE, DISPOSABLE MISCELLANEOUS DAILY
Qty: 100 EACH | Refills: 2 | Status: SHIPPED | OUTPATIENT
Start: 2024-12-26

## 2024-12-26 NOTE — TELEPHONE ENCOUNTER
Requested Prescriptions     Pending Prescriptions Disp Refills    Insulin Pen Needle (PEN NEEDLES) 32G X 4 MM Does not apply Misc 100 each 2     Si Device daily.    atorvastatin 10 MG Oral Tab 90 tablet 3     Sig: Take 1 tablet (10 mg total) by mouth nightly.     Future Appointments   Date Time Provider Department Center   2025  7:30 AM Sofie Erickson, G[facility]     Last A1c value was 8.2% done 10/31/2024.  Refill 24 [facility] SWAIN 24 [facility]

## 2024-12-26 NOTE — TELEPHONE ENCOUNTER
Requested Prescriptions     Pending Prescriptions Disp Refills    pioglitazone 15 MG Oral Tab 90 tablet 0     Sig: Take 1 tablet (15 mg total) by mouth daily.     HGBA1C:    Lab Results   Component Value Date    A1C 8.2 (H) 10/31/2024    A1C 8.5 (H) 09/17/2024    A1C 8.1 (H) 05/29/2024     (H) 10/31/2024     Your Appointments      Thursday February 06, 2025 7:30 AM  Video Visit with GISELE Greenwood  [facility] ([facility] [place]) [address], [address] [address]  [phone]   Please verify your telehealth insurance benefits prior to your appointment.    You must be in the [facility] during the virtual visit.     Please use the [employer] and launch the video visit 10 minutes prior to your scheduled appointment time to ensure your camera and microphone are working properly. Once the video visit has started you will be placed in a waiting room until the provider begins the visit.     You will receive an email confirmation with instructions.  If you have questions, call your doctor's office directly.    If you are having issues or need to use a desktop/laptop, please follow the below steps:        1.       Close out all other open apps (could be competing for audio resources)  2.       Disable Bluetooth  3.       Reboot mobile device before joining the video  4.       Come off Wi-Fi and switch over to Data    Please see our Video Visit Tip Sheet if you need additional assistance.     If you believe this is an emergency, please dial 911 immediately.                Last OFFICE VISIT: 9--CB    Last refill:  12-5-2024-30 tabs with 1 refills-CB

## 2025-01-03 DIAGNOSIS — E11.65 TYPE 2 DIABETES MELLITUS WITH HYPERGLYCEMIA, WITHOUT LONG-TERM CURRENT USE OF INSULIN (HCC): ICD-10-CM

## 2025-01-03 RX ORDER — INSULIN DEGLUDEC 100 U/ML
INJECTION, SOLUTION SUBCUTANEOUS
Qty: 15 ML | Refills: 0 | Status: SHIPPED | OUTPATIENT
Start: 2025-01-03

## 2025-01-03 NOTE — TELEPHONE ENCOUNTER
Requested Prescriptions     Pending Prescriptions Disp Refills    insulin degludec (TRESIBA FLEXTOUCH) 100 UNIT/ML Subcutaneous Solution Pen-injector 15 mL 0     Sig: Uses up to 20 units daily as directed with dose titration     Your appointments       Date & Time Appointment Department ([place])    Feb 06, 2025 7:30 AM CST Video Visit with Sofie Erickson APRN [place] [employer] [facility] ([facility] [place])    Please verify your telehealth insurance benefits prior to your appointment.    You must be in the [facility] during the virtual visit.     Please use the [facility] [employer] and launch the video visit 10 minutes prior to your scheduled appointment time to ensure your camera and microphone are working properly. Once the video visit has started you will be placed in a waiting room until the provider begins the visit.     You will receive an email confirmation with instructions.  If you have questions, call your doctor's office directly.    If you are having issues or need to use a desktop/laptop, please follow the below steps:        1.       Close out all other open apps (could be competing for audio resources)  2.       Disable Bluetooth  3.       Reboot mobile device before joining the video  4.       Come off Wi-Fi and switch over to Data    Please see our Video Visit Tip Sheet if you need additional assistance.     If you believe this is an emergency, please dial 911 immediately.                [facility]  [facility] [place]  [address], [address] [address]  [phone]          Last A1c value was 8.2% done 10/31/2024.    Refill 10/14/24  LOV 9/19/24

## 2025-01-23 ENCOUNTER — TELEPHONE (OUTPATIENT)
Facility: CLINIC | Age: 50
End: 2025-01-23

## 2025-01-23 DIAGNOSIS — E11.65 TYPE 2 DIABETES MELLITUS WITH HYPERGLYCEMIA, WITHOUT LONG-TERM CURRENT USE OF INSULIN (HCC): Primary | ICD-10-CM

## 2025-01-23 NOTE — TELEPHONE ENCOUNTER
Future Appointments   Date Time Provider Department Center   2/6/2025  7:30 AM Sofie Erickson APRN [facility]     Called pt to remind of labs needing to be done for VV coming up -spoke to patient they will get labs done before     Pended for CB to review

## 2025-02-03 ENCOUNTER — LAB ENCOUNTER (OUTPATIENT)
Dept: LAB | Age: 50
End: 2025-02-03
Attending: NURSE PRACTITIONER
Payer: COMMERCIAL

## 2025-02-03 DIAGNOSIS — E11.65 TYPE 2 DIABETES MELLITUS WITH HYPERGLYCEMIA, WITHOUT LONG-TERM CURRENT USE OF INSULIN (HCC): ICD-10-CM

## 2025-02-03 LAB
ALBUMIN SERPL-MCNC: 4.8 G/DL (ref 3.2–4.8)
ALBUMIN/GLOB SERPL: 1.8 {RATIO} (ref 1–2)
ALP LIVER SERPL-CCNC: 118 U/L
ALT SERPL-CCNC: 32 U/L
ANION GAP SERPL CALC-SCNC: 9 MMOL/L (ref 0–18)
AST SERPL-CCNC: 24 U/L (ref ?–34)
BILIRUB SERPL-MCNC: 0.9 MG/DL (ref 0.3–1.2)
BUN BLD-MCNC: 16 MG/DL (ref 9–23)
BUN/CREAT SERPL: 14.8 (ref 10–20)
CALCIUM BLD-MCNC: 9.2 MG/DL (ref 8.7–10.4)
CHLORIDE SERPL-SCNC: 103 MMOL/L (ref 98–112)
CHOLEST SERPL-MCNC: 176 MG/DL (ref ?–200)
CO2 SERPL-SCNC: 28 MMOL/L (ref 21–32)
CREAT BLD-MCNC: 1.08 MG/DL
CREAT UR-SCNC: 106.4 MG/DL
EGFRCR SERPLBLD CKD-EPI 2021: 84 ML/MIN/1.73M2 (ref 60–?)
EST. AVERAGE GLUCOSE BLD GHB EST-MCNC: 200 MG/DL (ref 68–126)
FASTING PATIENT LIPID ANSWER: NO
FASTING STATUS PATIENT QL REPORTED: NO
GLOBULIN PLAS-MCNC: 2.6 G/DL (ref 2–3.5)
GLUCOSE BLD-MCNC: 228 MG/DL (ref 70–99)
HBA1C MFR BLD: 8.6 % (ref ?–5.7)
HDLC SERPL-MCNC: 43 MG/DL (ref 40–59)
LDLC SERPL CALC-MCNC: 107 MG/DL (ref ?–100)
MICROALBUMIN UR-MCNC: 0.3 MG/DL
MICROALBUMIN/CREAT 24H UR-RTO: 2.8 UG/MG (ref ?–30)
NONHDLC SERPL-MCNC: 133 MG/DL (ref ?–130)
OSMOLALITY SERPL CALC.SUM OF ELEC: 298 MOSM/KG (ref 275–295)
POTASSIUM SERPL-SCNC: 4.2 MMOL/L (ref 3.5–5.1)
PROT SERPL-MCNC: 7.4 G/DL (ref 5.7–8.2)
SODIUM SERPL-SCNC: 140 MMOL/L (ref 136–145)
TRIGL SERPL-MCNC: 148 MG/DL (ref 30–149)
TSI SER-ACNC: 3.46 UIU/ML (ref 0.55–4.78)
VLDLC SERPL CALC-MCNC: 25 MG/DL (ref 0–30)

## 2025-02-03 PROCEDURE — 82570 ASSAY OF URINE CREATININE: CPT

## 2025-02-03 PROCEDURE — 82043 UR ALBUMIN QUANTITATIVE: CPT

## 2025-02-03 PROCEDURE — 36415 COLL VENOUS BLD VENIPUNCTURE: CPT

## 2025-02-03 PROCEDURE — 83036 HEMOGLOBIN GLYCOSYLATED A1C: CPT

## 2025-02-03 PROCEDURE — 80053 COMPREHEN METABOLIC PANEL: CPT

## 2025-02-03 PROCEDURE — 80061 LIPID PANEL: CPT

## 2025-02-03 PROCEDURE — 84443 ASSAY THYROID STIM HORMONE: CPT

## 2025-02-05 NOTE — PROGRESS NOTES
Telehealth visit: Please note that the following visit was completed using two-way, real-time interactive audio and video communication.  Time Spent:  19 min         Rajeev Lazo is a 50 year old presenting today for type 2 diabetes management.   Primary care physician: Og Zavaleta MD  Last Diabetes appointment w me:  --> increased glipizide dose   Trends have worsened.     Most recent A1C 8.6% ( last A1C 8.2%)   Patient admits this is a bad time of year: not as active, eating earlier dinner but then snacking on carby foods before bedtime,     Recent labs: improved triglycerides on lipids but LDL increased. 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       Diabetes History:  Type 2 DM ~    Patient has not had hospitalizations for blood sugar issues  denies any history of pancreatitis      Previous DM therapies:  Metformin IR    (diarrhea )    Current DM Regimen:  Glipizide ER 5mg 1 tab  daily in AM , 2.5mg w dinner   Metformin ER 500m tab daily (tolerance dose)   Tresiba flex touch U 100: 15 units once daily - > dosing 18 units   Pioglitazone 15mg once daily       HGBA1C:    Lab Results   Component Value Date    A1C 8.6 (H) 2025    A1C 8.2 (H) 10/31/2024    A1C 8.5 (H) 2024     (H) 2025       Lab Results   Component Value Date    CHOLEST 176 2025    CHOLEST 157 2024    TRIG 148 2025    TRIG 222 (H) 2024    HDL 43 2025    HDL 41 2024     (H) 2025    LDL 79 2024     Lab Results   Component Value Date    MICROALBCREA 2.8 2025    MICROALBCREA 5.7 2024      Lab Results   Component Value Date    CREATSERUM 1.08 2025     CREATSERUM 0.84 05/29/2024    EGFRCR 84 02/03/2025    EGFRCR 107 05/29/2024     Lab Results   Component Value Date    AST 24 02/03/2025    AST 16 05/29/2024    ALT 32 02/03/2025    ALT 27 05/29/2024       Lab Results   Component Value Date    TSH 3.462 02/03/2025    TSH 3.271 02/16/2024    T4F 0.9 04/20/2023         DM Complications:  Microvascular:   Neuropathy: yes  Retinopathy: no  Nephropathy: no    Macrovascular:  PVD: no  CAD: no  Stroke/CVA: no        Modifying factors:  Medication adherence: yes   Barriers: cost concerns (high deductible insurance)    Recent steroids, illness or infections ( past 3m): no     Allergies: Penicillins    Past Medical History:    Diabetes (HCC)    Hyperlipidemia    Obesity     Past Surgical History:   Procedure Laterality Date    Other surgical history      17 teeth removed, has dentures full upper and partial lower     Social History     Socioeconomic History    Marital status:    Tobacco Use    Smoking status: Never    Smokeless tobacco: Never   Substance and Sexual Activity    Alcohol use: Yes     Alcohol/week: 0.0 standard drinks of alcohol     Comment: social    Drug use: No   Other Topics Concern    Caffeine Concern Yes     Comment: diet coke; 4 large cups from [employer]    Exercise No     Comment: only at work    Seat Belt Yes     Family History   Problem Relation Age of Onset    Heart Disorder Father         defribrillator    Diabetes Father     No Known Problems Mother     Cancer Maternal Grandmother         breast    Diabetes Paternal Grandmother      Current Medication List:   Current Outpatient Medications   Medication Sig Dispense Refill    insulin degludec (TRESIBA FLEXTOUCH) 100 UNIT/ML Subcutaneous Solution Pen-injector Uses up to 20 units daily as directed with dose titration 15 mL 0    Insulin Pen Needle (PEN NEEDLES) 32G X 4 MM Does not apply Misc 1 Device daily. 100 each 2    atorvastatin 10 MG Oral Tab Take 1 tablet (10 mg total) by mouth nightly. 90  tablet 3    pioglitazone 15 MG Oral Tab Take 1 tablet (15 mg total) by mouth daily. 90 tablet 0    metFORMIN  MG Oral Tablet 24 Hr Take 1 tablet (500 mg total) by mouth daily. 90 tablet 1    glipiZIDE ER 5 MG Oral Tablet 24 Hr Take 1 tablet (5 mg total) by mouth every morning. 90 tablet 0    glipiZIDE ER 2.5 MG Oral Tablet 24 Hr Take with 5mg glipizide for total daily dose of 7.5mg 90 tablet 1    Omega-3 Fatty Acids (FISH OIL) 1200 MG Oral Cap Take by mouth.             DM associated review of  symptoms:   Endocrine: Polyuria, polyphagia, polydipsia: no  Neurological: Paresthesias: yes   HEENT: Blurred vision: no  Skin: no rash or wounds  Hematological: Hypoglycemia: no      Review of Systems     LUNGS: denies shortness of breath   CARDIOVASCULAR: denies chest pain  GI: denies abdominal pain, nausea or diarrhea   : denies dysuria    Physical exam:  There were no vitals taken for this visit.  There is no height or weight on file to calculate BMI.    Physical Exam     Constitutional: Normal appearance   Cardiovascular: Not assessed today   Pulmonary/Chest: Effort normal  Neurological: Alert and oriented .   Psychiatric: Normal mood and affect.         Assessment/Plan:      Dyslipidemia:   Last  labs 1-2025   Improving triglycerides   LDL has increased; patient denies missing statin   Will recheck 3 m   Continue atorvastatin 10 mg once daily         Obesity   Continue focus on healthier eating and  avoiding regular soda  Cost barrier w GLP1 rx ( >$800)       Type 2 diabetes mellitus with hyperglycemia, without long-term current use of insulin ([facility])    A1C: 8.5% ( last A1C 8.2%)   Last DM center weight: 285 lb   Diabetes control is increased  - and remains uncontrolled  Cost barrier with Diabetes     Reviewed with patient health impact associated with high glucose trends and the importance of better glucose control to prevent onset /progression of DM complications.   Despite DM /ASCVD benefit, cost barriers w  GLP1/SGLT2 rx (has high deductible insurance )   ~would not qualify for patient assistance since he has commercial insurance     Increase     Increase Tresiba units 100 flex touch 18 -->  22 units once daily     If your fasting trends  remain above 130 after 7 days,  increase dose to 25 units once daily     Increase Glipizide ER 5mg twice daily     Continue   Pioglitazone 15mg once daily   Metformin XR 500m tab daily in AM (cannot tolerate higher dose)     Reminded to carry glucose with him at all times while driving car- info on AVS but discussed viable options for  rule of 15 - details on AVS     Advised to call me if trends are still over 160 mg/dl in AM and over 180 mg/dl after meals        Reviewed  clinical significance of A1c, adverse effects of suboptimal glucose control, and goals of therapy   Reviewed the A1C test, what the value reflects and the goal for the patient.   Reminded pt on A1C and blood sugar targets (Fasting < 130 and post prandial <180 ) and complications associated with hyperglycemia and uncontrolled DM (on AVS)   Recommended SMBG 2- 3 x daily   Reviewed s/s and treatment of hypoglycemia (on AVS)   Glucagon rx cost barrier     Continue with lifestyle modifications since they have positive impact on diabetes/blood sugars/health (portion control, physical activity, weight loss)   Reinforced timing and adherence with medication, self-monitoring of blood glucose and routine follow up  Prefers telehealth appts due to his work schedule   2 week meter check in  -     Telehealth appt in  in 3 m  but recommended to contact DM clinic sooner if questions or concerns.    The patient indicates understanding of these issues and agrees to the plan.      No orders of the defined types were placed in this encounter.    Diabetes complications & risks surveillance:     A1C/Blood pressure: as reported    Nephropathy screening -  continue ace /arb rx.   LIPID screenin-  Atorvastatin rx.   Last  dilated eye exam: Last Dilated Eye Exam: 02/26/24   Exam shows retinopathy? Eye Exam shows Diabetic Retinopathy?: No  Last diabetic foot exam: No data recorded        This has been done in good steve to provide continuity of care in the best interest of the provider-patient relationship, due to the on-going public health crisis/national emergency and because of restrictions of visitation.  There are limitations of this visit as no or only very limited physical exam could be performed.  Every conscious effort was taken to allow for sufficient and adequate time.  This billing visit was spent on reviewing blood sugar trends, DM related labs, medications and decision making.  Appropriate medical decision-making and tests are ordered as detailed in the plan of care above.      Rajeev Violet understands phone or video evaluation is not a substitute for face-to-face examination or emergency care. Patient advised to go to ER or call 911 for worsening symptoms or acute distress.     Sofie Erickson, APRN

## 2025-02-06 ENCOUNTER — TELEMEDICINE (OUTPATIENT)
Facility: CLINIC | Age: 50
End: 2025-02-06
Payer: COMMERCIAL

## 2025-02-06 DIAGNOSIS — Z79.4 TYPE 2 DIABETES MELLITUS WITH HYPERGLYCEMIA, WITH LONG-TERM CURRENT USE OF INSULIN (HCC): ICD-10-CM

## 2025-02-06 DIAGNOSIS — E66.01 MORBID OBESITY (HCC): ICD-10-CM

## 2025-02-06 DIAGNOSIS — E78.2 MIXED HYPERLIPIDEMIA: Primary | ICD-10-CM

## 2025-02-06 DIAGNOSIS — E11.65 TYPE 2 DIABETES MELLITUS WITH HYPERGLYCEMIA, WITH LONG-TERM CURRENT USE OF INSULIN (HCC): ICD-10-CM

## 2025-02-06 RX ORDER — PIOGLITAZONE 15 MG/1
15 TABLET ORAL DAILY
Qty: 90 TABLET | Refills: 1 | Status: SHIPPED | OUTPATIENT
Start: 2025-02-06

## 2025-02-06 RX ORDER — INSULIN DEGLUDEC 100 U/ML
INJECTION, SOLUTION SUBCUTANEOUS
Qty: 15 ML | Refills: 0 | Status: SHIPPED | OUTPATIENT
Start: 2025-02-06

## 2025-02-06 RX ORDER — GLIPIZIDE 5 MG/1
TABLET, FILM COATED, EXTENDED RELEASE ORAL
Qty: 180 TABLET | Refills: 0 | Status: SHIPPED | OUTPATIENT
Start: 2025-02-06

## 2025-02-06 NOTE — PATIENT INSTRUCTIONS
We are here to help you manage your diabetes. Please continue with your primary care physician/provider for your routine health care maintenance --> you are due for an annual physical     A1C 8.6% ( up from 8.2%)     Increase Tresiba to 22 units once daily     If your fasting trends in morning remain above 130 after 7 days,  increase to 25 units once daily     Increase Glipizide ER 5mg twice daily     Continue   Metformin ER 500m tab daily   Pioglitazone 15mg once daily       American Diabetes Association: blood sugar targets:     Fasting blood sugar (before breakfast) Target:    (ideally less than 110)  2 hours after eating less than 180 (ideally less than  150 )     Call for blood sugars less than  75 or greater than  200 more than 2 times in a week         Watch for low blood sugars: (less than 70 )    Treatment of Low Blood Glucose Action Plan  1. Check blood glucose to be sure that it is low. You cannot  always go by symptoms or how you feel. If in doubt, treat your low blood glucose anyway.  Rule of 15 :     2. Take 15 grams of carbohydrate (carb). Here are some choices:    4 oz. regular fruit juice  3-4 glucose tablets  6 oz. regular soda   7-8 jelly beans    3. Recheck blood glucose after 10-15 minutes. If blood glucose is still low (less than 70 mg/dl) repeat the treatment (step 2).    4. If your next meal is more than one hour away, eat a small snack.    5. If you’re not sure what caused your low blood glucose, call your healthcare provider.    6. Always check your blood glucose before you drive       To treat a low, I recommend you carry with you easy, pre-portioned treatment for low blood sugars that are 15G of carbs:   - Children sized squeeze pouch applesauce (high fiber + carbs help prevent too high of a spike)  - Small children's sized juicebox- 15g carb --> 4oz juice box  - Glucose tablets from [employer], you can find them near diabetes supplies --> Note, you will need to eat 3-4  tablets to get to 15g of carbs  - Children sized fruit snack pack- look for one with 15 grams of total carbohydrate    AVOID complex carbs, or foods that contain fats along with carbs (like chocolate) can slow the absorption of glucose and should NOT be used to treat an emergency low     Refill policies:     Refills may be delayed if you have not had a recent office visit or recent labs as requested by your prescriber.       Our goal is to process your refill within 3  business day of receiving the refill request.   Contact your pharmacy at least 5 days prior to running out of medication and have them send an electronic request or submit a refill through My chart: select  “request refill” option in your [email or web address] account.  Refills are not addressed after hours or on weekends; covering providers  do not authorize routine medications on weekends.  Refills  received after 4pm on Fridays will be addressed on Mondays.    If  your prescription is due for a refill, and you are due for a follow up appointment., this will  may impact the ability for you to get a 90 supply if requested.   Yearly blood tests are  required for many medications to insure safe prescribing. If you are due for labs, you will have 30 days to complete the  requested labs before future refills are authorized.   In the event that your preferred pharmacy does not have the requested medication in stock (e.g. Backordered), it is your responsibility to find another pharmacy that has the requested medication available.  We will gladly send a new prescription to that pharmacy at your request.  To best provide you care, patients receiving routine medications need to be seen at least twice per year. However if the A1C is above 8% you will be need to be seen more frequently as recommended by provider and this will also impact your ability get obtain refills          Thank you   Sofie Erickson   [phone]

## 2025-03-06 DIAGNOSIS — Z79.4 TYPE 2 DIABETES MELLITUS WITH HYPERGLYCEMIA, WITH LONG-TERM CURRENT USE OF INSULIN (HCC): ICD-10-CM

## 2025-03-06 DIAGNOSIS — E11.65 TYPE 2 DIABETES MELLITUS WITH HYPERGLYCEMIA, WITH LONG-TERM CURRENT USE OF INSULIN (HCC): ICD-10-CM

## 2025-03-06 RX ORDER — INSULIN DEGLUDEC 100 U/ML
INJECTION, SOLUTION SUBCUTANEOUS
Qty: 15 ML | Refills: 0 | Status: SHIPPED | OUTPATIENT
Start: 2025-03-06 | End: 2025-03-07

## 2025-03-06 RX ORDER — PIOGLITAZONE 15 MG/1
15 TABLET ORAL DAILY
Qty: 90 TABLET | Refills: 1 | Status: SHIPPED | OUTPATIENT
Start: 2025-03-06 | End: 2025-03-07

## 2025-03-06 NOTE — TELEPHONE ENCOUNTER
Requested Prescriptions     Pending Prescriptions Disp Refills    insulin degludec (TRESIBA FLEXTOUCH) 100 UNIT/ML Subcutaneous Solution Pen-injector 15 mL 0     Sig: Uses up to 30 units daily as directed with dose titration    pioglitazone 15 MG Oral Tab 90 tablet 1     Sig: Take 1 tablet (15 mg total) by mouth daily.     HGBA1C:    Lab Results   Component Value Date    A1C 8.6 (H) 02/03/2025    A1C 8.2 (H) 10/31/2024    A1C 8.5 (H) 09/17/2024     (H) 02/03/2025     Your Appointments      Thursday May 22, 2025 7:30 AM  Video Visit with GISELE Greenwood  [facility], [facility] ([facility] [place]) [address], [place] [id number]  [phone]   Please verify your telehealth insurance benefits prior to your appointment.    You must be in the [facility] during the virtual visit.     Please use the [employer] and launch the video visit 10 minutes prior to your scheduled appointment time to ensure your camera and microphone are working properly. Once the video visit has started you will be placed in a waiting room until the provider begins the visit.     You will receive an email confirmation with instructions.  If you have questions, call your doctor's office directly.    If you are having issues or need to use a desktop/laptop, please follow the below steps:        1.       Close out all other open apps (could be competing for audio resources)  2.       Disable Bluetooth  3.       Reboot mobile device before joining the video  4.       Come off Wi-Fi and switch over to Data    Please see our Video Visit Tip Sheet if you need additional assistance.     If you believe this is an emergency, please dial 911 immediately.                  Please deny refills-DUPLICATE    Last Office Visit:  2-6-2025-CB    Last Refill:      Tresiba--2-6-2025-15 ml with 0 refills-CB    Pioglitazone-- 2-6-2025-90 tabs with 1 refills-CB

## 2025-03-07 DIAGNOSIS — Z79.4 TYPE 2 DIABETES MELLITUS WITH HYPERGLYCEMIA, WITH LONG-TERM CURRENT USE OF INSULIN (HCC): Primary | ICD-10-CM

## 2025-03-07 DIAGNOSIS — E11.65 TYPE 2 DIABETES MELLITUS WITH HYPERGLYCEMIA, WITH LONG-TERM CURRENT USE OF INSULIN (HCC): Primary | ICD-10-CM

## 2025-03-07 RX ORDER — PIOGLITAZONE 15 MG/1
15 TABLET ORAL DAILY
Qty: 90 TABLET | Refills: 1 | Status: SHIPPED | OUTPATIENT
Start: 2025-03-07

## 2025-03-07 RX ORDER — INSULIN DEGLUDEC 100 U/ML
INJECTION, SOLUTION SUBCUTANEOUS
Qty: 15 ML | Refills: 0 | Status: SHIPPED | OUTPATIENT
Start: 2025-03-07

## 2025-03-07 NOTE — TELEPHONE ENCOUNTER
DUPLICATE REQUEST, PLEASE DENY  Requested Prescriptions     Pending Prescriptions Disp Refills    insulin degludec (TRESIBA FLEXTOUCH) 100 UNIT/ML Subcutaneous Solution Pen-injector 15 mL 0     Sig: Uses up to 30 units daily as directed with dose titration    pioglitazone 15 MG Oral Tab 90 tablet 1     Sig: Take 1 tablet (15 mg total) by mouth daily.     Future Appointments   Date Time Provider Department Center   5/22/2025  7:30 AM Sofie Erickson APRN [facility]     Your appointments       Date & Time Appointment Department (Center)    May 22, 2025 7:30 AM CDT Video Visit with Sofie Erickson APRN [facility] ([facility] [place])    Please verify your telehealth insurance benefits prior to your appointment.    You must be in the [facility] during the virtual visit.     Please use the [employer] and launch the video visit 10 minutes prior to your scheduled appointment time to ensure your camera and microphone are working properly. Once the video visit has started you will be placed in a waiting room until the provider begins the visit.     You will receive an email confirmation with instructions.  If you have questions, call your doctor's office directly.    If you are having issues or need to use a desktop/laptop, please follow the below steps:        1.       Close out all other open apps (could be competing for audio resources)  2.       Disable Bluetooth  3.       Reboot mobile device before joining the video  4.       Come off Wi-Fi and switch over to Data    Please see our Video Visit Tip Sheet if you need additional assistance.     If you believe this is an emergency, please dial 911 immediately.                [facility]  [facility] [place]  [address], [address]  [facility] [address]  [phone]          Last A1c value was 8.6% done 2/3/2025.  Last  OV:02/06/2025  Last refill:03/06/2025

## 2025-04-24 DIAGNOSIS — E11.65 TYPE 2 DIABETES MELLITUS WITH HYPERGLYCEMIA, WITHOUT LONG-TERM CURRENT USE OF INSULIN (HCC): ICD-10-CM

## 2025-04-24 RX ORDER — METFORMIN HYDROCHLORIDE 500 MG/1
500 TABLET, EXTENDED RELEASE ORAL DAILY
Qty: 90 TABLET | Refills: 1 | Status: SHIPPED | OUTPATIENT
Start: 2025-04-24

## 2025-04-24 NOTE — TELEPHONE ENCOUNTER
Requested Prescriptions     Pending Prescriptions Disp Refills    metFORMIN  MG Oral Tablet 24 Hr 90 tablet 1     Sig: Take 1 tablet (500 mg total) by mouth daily.     Future Appointments   Date Time Provider Department Center   5/22/2025  7:30 AM Sofie Erickson APRN [facility]     Your appointments       Date & Time Appointment Department (Center)    May 22, 2025 7:30 AM CDT Video Visit with Sofie Erickson APRN [facility] ([facility] [place])    Please verify your telehealth insurance benefits prior to your appointment.    You must be in the [facility] during the virtual visit.     Please use the [employer] and launch the video visit 10 minutes prior to your scheduled appointment time to ensure your camera and microphone are working properly. Once the video visit has started you will be placed in a waiting room until the provider begins the visit.     You will receive an email confirmation with instructions.  If you have questions, call your doctor's office directly.    If you are having issues or need to use a desktop/laptop, please follow the below steps:        1.       Close out all other open apps (could be competing for audio resources)  2.       Disable Bluetooth  3.       Reboot mobile device before joining the video  4.       Come off Wi-Fi and switch over to Data    Please see our Video Visit Tip Sheet if you need additional assistance.     If you believe this is an emergency, please dial 911 immediately.                [facility]  [facility] [place]  [address], [address] [address]  [phone]          Last A1c value was 8.6% done 2/3/2025.  Last OV:02/06/2025  Last refill:10/14/2024

## 2025-05-15 ENCOUNTER — TELEPHONE (OUTPATIENT)
Facility: CLINIC | Age: 50
End: 2025-05-15

## 2025-05-15 NOTE — TELEPHONE ENCOUNTER
Future Appointments   Date Time Provider Department Center   5/22/2025  7:30 AM Sofie Erickson APRN [facility]     Video visit for telehealth coming up patient need updated A1C     Last A1c value was 8.6% done 2/3/2025.  Labs order from  last telehealth 02/06/25     Sending MCM   Lipid, CMP , A1C already order

## 2025-05-19 ENCOUNTER — LAB ENCOUNTER (OUTPATIENT)
Dept: LAB | Age: 50
End: 2025-05-19
Attending: NURSE PRACTITIONER
Payer: COMMERCIAL

## 2025-05-19 DIAGNOSIS — E11.65 TYPE 2 DIABETES MELLITUS WITH HYPERGLYCEMIA, WITH LONG-TERM CURRENT USE OF INSULIN (HCC): ICD-10-CM

## 2025-05-19 DIAGNOSIS — Z79.4 TYPE 2 DIABETES MELLITUS WITH HYPERGLYCEMIA, WITH LONG-TERM CURRENT USE OF INSULIN (HCC): ICD-10-CM

## 2025-05-19 LAB
ALBUMIN SERPL-MCNC: 4.6 G/DL (ref 3.2–4.8)
ALBUMIN/GLOB SERPL: 1.8 {RATIO} (ref 1–2)
ALP LIVER SERPL-CCNC: 107 U/L (ref 45–117)
ALT SERPL-CCNC: 24 U/L (ref 10–49)
ANION GAP SERPL CALC-SCNC: 5 MMOL/L (ref 0–18)
AST SERPL-CCNC: 23 U/L (ref ?–34)
BILIRUB SERPL-MCNC: 0.7 MG/DL (ref 0.3–1.2)
BUN BLD-MCNC: 14 MG/DL (ref 9–23)
CALCIUM BLD-MCNC: 9.4 MG/DL (ref 8.7–10.6)
CHLORIDE SERPL-SCNC: 105 MMOL/L (ref 98–112)
CHOLEST SERPL-MCNC: 149 MG/DL (ref ?–200)
CO2 SERPL-SCNC: 28 MMOL/L (ref 21–32)
CREAT BLD-MCNC: 0.88 MG/DL (ref 0.7–1.3)
EGFRCR SERPLBLD CKD-EPI 2021: 105 ML/MIN/1.73M2 (ref 60–?)
EST. AVERAGE GLUCOSE BLD GHB EST-MCNC: 192 MG/DL (ref 68–126)
FASTING PATIENT LIPID ANSWER: NO
FASTING STATUS PATIENT QL REPORTED: NO
GLOBULIN PLAS-MCNC: 2.5 G/DL (ref 2–3.5)
GLUCOSE BLD-MCNC: 109 MG/DL (ref 70–99)
HBA1C MFR BLD: 8.3 % (ref ?–5.7)
HDLC SERPL-MCNC: 47 MG/DL (ref 40–59)
LDLC SERPL CALC-MCNC: 83 MG/DL (ref ?–100)
NONHDLC SERPL-MCNC: 102 MG/DL (ref ?–130)
OSMOLALITY SERPL CALC.SUM OF ELEC: 287 MOSM/KG (ref 275–295)
POTASSIUM SERPL-SCNC: 3.7 MMOL/L (ref 3.5–5.1)
PROT SERPL-MCNC: 7.1 G/DL (ref 5.7–8.2)
SODIUM SERPL-SCNC: 138 MMOL/L (ref 136–145)
TRIGL SERPL-MCNC: 103 MG/DL (ref 30–149)
VLDLC SERPL CALC-MCNC: 16 MG/DL (ref 0–30)

## 2025-05-19 PROCEDURE — 36415 COLL VENOUS BLD VENIPUNCTURE: CPT

## 2025-05-19 PROCEDURE — 80053 COMPREHEN METABOLIC PANEL: CPT

## 2025-05-19 PROCEDURE — 80061 LIPID PANEL: CPT

## 2025-05-19 PROCEDURE — 83036 HEMOGLOBIN GLYCOSYLATED A1C: CPT

## 2025-05-19 RX ORDER — INSULIN DEGLUDEC 100 U/ML
INJECTION, SOLUTION SUBCUTANEOUS
Qty: 15 ML | Refills: 0 | Status: SHIPPED | OUTPATIENT
Start: 2025-05-19

## 2025-05-19 NOTE — TELEPHONE ENCOUNTER
Requested Prescriptions     Pending Prescriptions Disp Refills    insulin degludec (TRESIBA FLEXTOUCH) 100 UNIT/ML Subcutaneous Solution Pen-injector 15 mL 0     Sig: Uses up to 30 units daily as directed with dose titration     Future Appointments   Date Time Provider Department Center   5/22/2025  7:30 AM Sofie Erickson APRN [facility]     Last A1c value was 8.6% done 2/3/2025.  Bogjbq28/07/25 Romero   LOV 02/05/25Romero

## 2025-05-21 NOTE — PROGRESS NOTES
Telehealth visit: Please note that the following visit was completed using two-way, real-time interactive audio and video communication.  Time Spent:  23 min          Rajeev Lazo is a 50 year old presenting today for type 2 diabetes management.   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       Diabetes History:  Type 2 DM ~    Patient has not had hospitalizations for blood sugar issues  denies any history of pancreatitis      Previous DM therapies:  Metformin IR    (diarrhea )    Current DM Regimen:  Glipizide ER 5mg 1 tab twice   daily    Metformin ER 500m tab daily (tolerance dose)   Tresiba flex touch U 100: 22  units once daily   Pioglitazone 15mg once daily         HGBA1C:    Lab Results   Component Value Date    A1C 8.3 (H) 2025    A1C 8.6 (H) 2025    A1C 8.2 (H) 10/31/2024     (H) 2025       Lab Results   Component Value Date    CHOLEST 149 2025    CHOLEST 176 2025    TRIG 103 2025    TRIG 148 2025    HDL 47 2025    HDL 43 2025    LDL 83 2025     (H) 2025     Lab Results   Component Value Date    MICROALBCREA 2.8 2025    MICROALBCREA 5.7 2024      Lab Results   Component Value Date    CREATSERUM 0.88 2025    CREATSERUM 1.08 2025    EGFRCR 105 2025    EGFRCR 84 2025     Lab Results   Component Value Date    AST 23 2025    AST 24 2025    ALT 24 2025    ALT 32 2025        Lab Results   Component Value Date    TSH 3.462 02/03/2025    TSH 3.271 02/16/2024    T4F 0.9 04/20/2023         DM Complications:  Microvascular:   Neuropathy: yes  Retinopathy: no  Nephropathy: no    Macrovascular:  PVD: no  CAD: no  Stroke/CVA: no        Modifying factors:  Medication adherence: yes   Barriers: cost concerns (high deductible insurance)    Recent steroids, illness or infections ( past 3m): no     Allergies: Penicillins    Past Medical History:    Diabetes (HCC)    Hyperlipidemia    Obesity     Past Surgical History:   Procedure Laterality Date    Other surgical history      17 teeth removed, has dentures full upper and partial lower     Social History     Socioeconomic History    Marital status:    Tobacco Use    Smoking status: Never    Smokeless tobacco: Never   Substance and Sexual Activity    Alcohol use: Yes     Alcohol/week: 0.0 standard drinks of alcohol     Comment: social    Drug use: No   Other Topics Concern    Caffeine Concern Yes     Comment: diet coke; 4 large cups from [employer]    Exercise No     Comment: only at work    Seat Belt Yes     Family History   Problem Relation Age of Onset    Heart Disorder Father         defribrillator    Diabetes Father     No Known Problems Mother     Cancer Maternal Grandmother         breast    Diabetes Paternal Grandmother      Current Medication List:   Current Outpatient Medications   Medication Sig Dispense Refill    Tirzepatide (MOUNJARO) 5 MG/0.5ML Subcutaneous Solution Auto-injector Inject 5 mg into the skin once a week. 2 mL 0    pioglitazone 15 MG Oral Tab Take 1 tablet (15 mg total) by mouth daily. 90 tablet 1    insulin degludec (TRESIBA FLEXTOUCH) 100 UNIT/ML Subcutaneous Solution Pen-injector Uses up to 30 units daily as directed with dose titration 15 mL 0    metFORMIN  MG Oral Tablet 24 Hr Take 1 tablet (500 mg total) by mouth daily. 90 tablet 1    glipiZIDE ER 5 MG Oral Tablet 24 Hr 2 tabs daily as directed 180  tablet 0    Insulin Pen Needle (PEN NEEDLES) 32G X 4 MM Does not apply Misc 1 Device daily. 100 each 2    atorvastatin 10 MG Oral Tab Take 1 tablet (10 mg total) by mouth nightly. 90 tablet 3    Omega-3 Fatty Acids (FISH OIL) 1200 MG Oral Cap Take by mouth.             DM associated review of  symptoms:   Endocrine: Polyuria, polyphagia, polydipsia: no  Neurological: Paresthesias: yes   HEENT: Blurred vision: no  Skin: no rash or wounds  Hematological: Hypoglycemia: no      Review of Systems     LUNGS: denies shortness of breath   CARDIOVASCULAR: denies chest pain  GI: denies abdominal pain, nausea or diarrhea   : denies dysuria    Physical exam:  There were no vitals taken for this visit.  There is no height or weight on file to calculate BMI.    Physical Exam     Constitutional: Normal appearance   Cardiovascular: Not assessed today   Pulmonary/Chest: Effort normal  Neurological: Alert and oriented .   Psychiatric: Normal mood and affect.         Assessment/Plan:    Dyslipidemia   Cholesterol: 149, done on 5/19/2025.  HDL Cholesterol: 47, done on 5/19/2025.  TriGlycerides 103, done on 5/19/2025.  LDL Cholesterol: 83, done on 5/19/2025.      Needs ongoing monitoring   Improved LDL and triglycerides   Continue atorvastatin 10 mg once daily         Obesity   Continue focus on healthier eating and  avoiding regular soda  In past, cost barrier w GLP1 rx ( >$800) however patient is agreeable to investigate prescription cost       Type 2 diabetes mellitus with hyperglycemia, without long-term current use of insulin ([facility])    A1C: 8.3% ( last A1C 8.5%)   Last DM center weight: 285 lb   Diabetes control  improved but still sub optimal   Cost barrier with Diabetes prescriptions     Reviewed with patient health impact associated with high glucose trends and the importance of better glucose control to prevent onset /progression of DM complications.   In past ,  cost barriers w GLP1/SGLT2 rx (has high deductible insurance )  but agreeable to investigate cost of Mounjuaro    Plan to start 2.5mg mounjaro ( w voucher) and increase to 5mg once weekly after 4 week( sent prescription for 5mg Mounjaro to see cost of prescription)   Advised to contact me before picking up prescription   Reviewed pen device, auto injector features, dosing     For now continue:   Tresiba units 100 flex touch 18 -->  22 units once daily   Glipizide ER 5mg twice daily --> take 2 tab daily in AM for prescription adherence     Pioglitazone 15mg once daily   Metformin XR 500m tab daily in AM (cannot tolerate higher dose)     Reminded to carry glucose with him at all times while driving car- info on AVS but discussed viable options for  rule of 15 - details on AVS     Advised to call me if trends are still over 160 mg/dl in AM and over 180 mg/dl after meals        Reviewed  clinical significance of A1c, adverse effects of suboptimal glucose control, and goals of therapy   Reviewed the A1C test, what the value reflects and the goal for the patient.   Reminded pt on A1C and blood sugar targets (Fasting < 130 and post prandial <180 ) and complications associated with hyperglycemia and uncontrolled DM (on AVS)   Recommended SMBG 2- 3 x daily   Reviewed s/s and treatment of hypoglycemia (on AVS)   Glucagon rx cost barrier     Continue with lifestyle modifications since they have positive impact on diabetes/blood sugars/health (portion control, physical activity, weight loss)   Reinforced timing and adherence with medication, self-monitoring of blood glucose and routine follow up  Prefers telehealth appts due to his work schedule - but reminded he is over due for PCP physical     Telehealth appt in  in 3 m  but recommended to contact DM clinic sooner if questions or concerns.    The patient indicates understanding of these issues and agrees to the plan.      Orders Placed This Encounter    Hemoglobin A1C     Standing Status:   Future     Expected Date:   2025      Expiration Date:   5/22/2026    Tirzepatide (MOUNJARO) 5 MG/0.5ML Subcutaneous Solution Auto-injector     Sig: Inject 5 mg into the skin once a week.     Dispense:  2 mL     Refill:  0     DX e11.9     Diabetes complications & risks surveillance:   A1C/Blood pressure: as reported    Last dilated eye exam: No data recorded Exam shows retinopathy? No data recorded  Last diabetic foot exam: No data recorded  Nephropathy screening:   No indication for ace /arb rx.    Lab Results   Component Value Date    EGFRCR 105 05/19/2025    MICROALBCREA 2.8 02/03/2025     LIPID screening:    Lab Results   Component Value Date    CHOLEST 149 05/19/2025    LDL 83 05/19/2025    TRIG 103 05/19/2025    HDL 47 05/19/2025    FASTING No 05/19/2025    FASTING No 05/19/2025     Cholesterol Lowering Medications            atorvastatin 10 MG Oral Tab                 This has been done in good steve to provide continuity of care in the best interest of the provider-patient relationship, due to the on-going public health crisis/national emergency and because of restrictions of visitation.  There are limitations of this visit as no or only very limited physical exam could be performed.  Every conscious effort was taken to allow for sufficient and adequate time.  This billing visit was spent on reviewing blood sugar trends, DM related labs, medications and decision making.  Appropriate medical decision-making and tests are ordered as detailed in the plan of care above.      Rajeev Lazo understands phone or video evaluation is not a substitute for face-to-face examination or emergency care. Patient advised to go to ER or call 911 for worsening symptoms or acute distress.     Sofie Erickson, APRN

## 2025-05-22 ENCOUNTER — TELEMEDICINE (OUTPATIENT)
Facility: CLINIC | Age: 50
End: 2025-05-22
Payer: COMMERCIAL

## 2025-05-22 DIAGNOSIS — E11.65 TYPE 2 DIABETES MELLITUS WITH HYPERGLYCEMIA, WITH LONG-TERM CURRENT USE OF INSULIN (HCC): Primary | ICD-10-CM

## 2025-05-22 DIAGNOSIS — Z79.4 TYPE 2 DIABETES MELLITUS WITH HYPERGLYCEMIA, WITH LONG-TERM CURRENT USE OF INSULIN (HCC): Primary | ICD-10-CM

## 2025-05-22 DIAGNOSIS — E78.2 MIXED HYPERLIPIDEMIA: ICD-10-CM

## 2025-05-22 DIAGNOSIS — E66.01 MORBID OBESITY (HCC): ICD-10-CM

## 2025-05-22 PROCEDURE — 98006 SYNCH AUDIO-VIDEO EST MOD 30: CPT | Performed by: NURSE PRACTITIONER

## 2025-05-22 RX ORDER — TIRZEPATIDE 5 MG/.5ML
5 INJECTION, SOLUTION SUBCUTANEOUS WEEKLY
Qty: 2 ML | Refills: 0 | Status: SHIPPED | OUTPATIENT
Start: 2025-05-22

## 2025-05-22 NOTE — PATIENT INSTRUCTIONS
A1C 8.3% (last A1C 8.6%)     Please have your diabetic eye exam. This exam is critical to preventing and treating eye conditions caused by diabetes that could potentially lead to vision loss    Please be sure to schedule a physical with Dr Zavaleta before our next follow up     I want to see if your insurance is now covering GLP (gut hormone) for type 2 diabetes -     Lets try and see what cost is for Mounjaro . I sent the 5mg Mounjaro prescription to your pharmacy. We are just checking the cost w your plan- do not pick the medication up --> just checking cost   The starter dose is 2.5mg Mounjaro weekly --> which we can provide a starter kit or 4 week free trial voucher.     Visit [employer] to get a coupon /voucher for discount   See more info below on Mounjaro     For now, continue     Metformin ER 500m tab daily   Tresiba flex touch U 100: 22  units once daily   Pioglitazone 15mg once daily       Start Mounjaro:     It works in the following ways:     Helps the body release insulin when the blood sugar is high , especially after eating   Helps the body remove excess sugar from the blood, when needed.   Stops the liver from making and releasing too much sugar   Lowers your appetite   Slows down how quickly the food leaves the stomach -resulting in you feeling full for longer time periods.       Dose instructions:     Start  Mounjaro 2.5mg once weekly for four weeks  After 4 weeks, increase the Mounjaro to 5mg once weekly , unless you are having any stomach side effects.   Contact me if you do not want to increase the dose up to 5mg     Mounjaro dose options 2.5mg / 5mg /7.5mg /10mg /12.5mg /15mg (weekly max)   The dose can be increased based on your tolerance, side effects and response to medication.     Each of these doses should  be taken for a minimum of four weeks. The dose is increased slowly to allow  time  to be sure you are tolerating the medication.     The dose selected depends on how  patients are tolerating it and how the blood sugars are doing as well as weight loss effect.     If you are wanting a dose adjustment, please contact me so that I can  review your trends as there may be other medications need adjusting. This may also require an office visit - either in office visit or video visit through [employer].     One of the most common side effects of this  medications is nausea and GI upset.   Here is some advice to reduce these side effects:  - if you're having nausea and ate a large meal before the nausea, the volume of food may be too much for your stomach to handle, reduce meal size  - practice mindfulness to stop eating once full  - avoid eating when not hungry  - avoid high-fat or spicy food (particularly during the initial dose-escalation period)  - moderate your intake of alcohol and fizzy drinks (particularly in the context of nausea and heartburn)  - If you have constipation, be sure you are drinking enough water and eating enough fiber  - if you notice some nausea symptoms and haven't eaten in a while, make sure you are eating at least three meals per day, can try snacking on high protein snack item like yogurt, string cheese, cottage cheese, protein shake  - If you have made these changes and you still notice nausea, please contact the clinic    How to use mounjaro: [email or web address]          Try to take 2 Glipizide XL 5mg in AM to avoid missing doses in evening     I really would like for you to check Blood sugar at least 1-2 x week later in day    American Diabetes Association: blood sugar targets:     Fasting blood sugar (before breakfast) Target:    (ideally less than 110)  2 hours after eating less than 180 (ideally less than  150 )     Call for blood sugars less than  75 or greater than  200 more than 2 times in a week         Watch for low blood sugars: (less than 70 )    Treatment of Low Blood Glucose Action Plan  1. Check blood glucose to be  sure that it is low. You cannot  always go by symptoms or how you feel. If in doubt, treat your low blood glucose anyway.  Rule of 15 :     2. Take 15 grams of carbohydrate (carb). Here are some choices:    4 oz. regular fruit juice  3-4 glucose tablets  6 oz. regular soda   7-8 jelly beans    3. Recheck blood glucose after 10-15 minutes. If blood glucose is still low (less than 70 mg/dl) repeat the treatment (step 2).    4. If your next meal is more than one hour away, eat a small snack.    5. If you’re not sure what caused your low blood glucose, call your healthcare provider.    6. Always check your blood glucose before you drive       To treat a low, I recommend you carry with you easy, pre-portioned treatment for low blood sugars that are 15G of carbs:   - Children sized squeeze pouch applesauce (high fiber + carbs help prevent too high of a spike)  - Small children's sized juicebox- 15g carb --> 4oz juice box  - Glucose tablets from [employer], you can find them near diabetes supplies --> Note, you will need to eat 3-4 tablets to get to 15g of carbs  - Children sized fruit snack pack- look for one with 15 grams of total carbohydrate    AVOID complex carbs, or foods that contain fats along with carbs (like chocolate) can slow the absorption of glucose and should NOT be used to treat an emergency low

## 2025-05-27 ENCOUNTER — PATIENT MESSAGE (OUTPATIENT)
Facility: CLINIC | Age: 50
End: 2025-05-27

## 2025-05-27 NOTE — TELEPHONE ENCOUNTER
Patient advised Mounjaro 5 mg was $25, picked up prescription. Plan per last visit was as below and advised patient:       Lets try and see what cost is for Mounjaro . I sent the 5mg Mounjaro prescription to your pharmacy. We are just checking the cost w your plan- do not pick the medication up --> just checking cost   The starter dose is 2.5mg Mounjaro weekly --> which we can provide a starter kit or 4 week free trial voucher.      Visit [employer] to get a coupon /voucher for discount   See more info below on Mounjaro

## 2025-05-28 ENCOUNTER — NURSE ONLY (OUTPATIENT)
Facility: CLINIC | Age: 50
End: 2025-05-28
Payer: COMMERCIAL

## 2025-05-28 DIAGNOSIS — Z79.4 TYPE 2 DIABETES MELLITUS WITH HYPERGLYCEMIA, WITH LONG-TERM CURRENT USE OF INSULIN (HCC): Primary | ICD-10-CM

## 2025-05-28 DIAGNOSIS — E11.65 TYPE 2 DIABETES MELLITUS WITH HYPERGLYCEMIA, WITH LONG-TERM CURRENT USE OF INSULIN (HCC): Primary | ICD-10-CM

## 2025-05-28 RX ORDER — TIRZEPATIDE 2.5 MG/.5ML
2.5 INJECTION, SOLUTION SUBCUTANEOUS
Qty: 2 ML | Refills: 0 | COMMUNITY
Start: 2025-05-28

## 2025-05-28 NOTE — PROGRESS NOTES
Patient agreeable to start Mounjaro 2.5mg subcutaneous once weekly since covered on insurance plan and cost is $25   Will increase dose Mounjaro to 5mg once weekly after 4 weeks  Decrease glipizide ER 5mg 2  to 1 tab daily   Continue Tresiba, Metformin , pioglitazone     Check in 4 - 6 weeks

## 2025-06-13 RX ORDER — GLIPIZIDE 5 MG/1
TABLET, FILM COATED, EXTENDED RELEASE ORAL
Qty: 180 TABLET | Refills: 0 | Status: SHIPPED | OUTPATIENT
Start: 2025-06-13

## 2025-06-13 NOTE — TELEPHONE ENCOUNTER
Last office visit 05/22/2025   Future Appointments   Date Time Provider Department Center   9/18/2025  7:30 AM Sofie Erickson APRN [facility]     Last A1c value was 8.3% done 5/19/2025.

## 2025-07-02 DIAGNOSIS — E11.65 TYPE 2 DIABETES MELLITUS WITH HYPERGLYCEMIA, WITH LONG-TERM CURRENT USE OF INSULIN (HCC): ICD-10-CM

## 2025-07-02 DIAGNOSIS — Z79.4 TYPE 2 DIABETES MELLITUS WITH HYPERGLYCEMIA, WITH LONG-TERM CURRENT USE OF INSULIN (HCC): ICD-10-CM

## 2025-07-02 RX ORDER — INSULIN DEGLUDEC 100 U/ML
30 INJECTION, SOLUTION SUBCUTANEOUS DAILY
Qty: 15 ML | Refills: 0 | Status: SHIPPED | OUTPATIENT
Start: 2025-07-02

## 2025-07-02 NOTE — TELEPHONE ENCOUNTER
Requested Prescriptions     Pending Prescriptions Disp Refills    TRESIBA FLEXTOUCH 100 UNIT/ML Subcutaneous Solution Pen-injector [Pharmacy Med Name: TRESIBA FLEXTOUCH PEN ([id number])[id number]] 15 mL 0     Sig: INJECT UP TO 30 UNITS UNDER THE SKIN DAILY AS DIRECTED     Your appointments       Date & Time Appointment Department ([place])    Sep 18, 2025 7:30 AM CDT Video Visit with Sofie Erickson APRN [employer] [facility] ([facility] [place])    Please verify your telehealth insurance benefits prior to your appointment.    Please ensure that your copay and any outstanding balance are paid before your video appointment to help us provide continuous care. Thank you for your understanding    You must be in the [facility] during the virtual visit.     Please use the [employer] and launch the video visit 10 minutes prior to your scheduled appointment time to ensure your camera and microphone are working properly. Once the video visit has started you will be placed in a waiting room until the provider begins the visit.     You will receive an email confirmation with instructions.  If you have questions, call your doctor's office directly.    If you are having issues or need to use a desktop/laptop, please follow the below steps:        1.       Close out all other open apps (could be competing for audio resources)  2.       Disable Bluetooth  3.       Reboot mobile device before joining the video  4.       Come off Wi-Fi and switch over to Data    Please see our Video Visit Tip Sheet if you need additional assistance.     If you believe this is an emergency, please dial 911 immediately.                [employer] [facility]  [facility] [place]  [address], [address] [address]  [phone]         Last A1c value was 8.3% done 5/19/2025.   Last office visit: 5/22/25 - CB  Last refill: 5/19/25 - CB

## 2025-07-07 ENCOUNTER — PATIENT MESSAGE (OUTPATIENT)
Facility: CLINIC | Age: 50
End: 2025-07-07

## 2025-07-08 RX ORDER — ATORVASTATIN CALCIUM 10 MG/1
10 TABLET, FILM COATED ORAL NIGHTLY
Qty: 90 TABLET | Refills: 3 | Status: SHIPPED | OUTPATIENT
Start: 2025-07-08

## 2025-07-08 NOTE — TELEPHONE ENCOUNTER
Requested Prescriptions     Pending Prescriptions Disp Refills    atorvastatin 10 MG Oral Tab 90 tablet 3     Sig: Take 1 tablet (10 mg total) by mouth nightly.     Your appointments       Date & Time Appointment Department ([place])    Sep 18, 2025 7:30 AM CDT Video Visit with Sofie Erickson APRN [facility] ([facility] [place])    Please verify your telehealth insurance benefits prior to your appointment.    Please ensure that your copay and any outstanding balance are paid before your video appointment to help us provide continuous care. Thank you for your understanding    You must be in the state [facility] during the virtual visit.     Please use the [employer] and launch the video visit 10 minutes prior to your scheduled appointment time to ensure your camera and microphone are working properly. Once the video visit has started you will be placed in a waiting room until the provider begins the visit.     You will receive an email confirmation with instructions.  If you have questions, call your doctor's office directly.    If you are having issues or need to use a desktop/laptop, please follow the below steps:        1.       Close out all other open apps (could be competing for audio resources)  2.       Disable Bluetooth  3.       Reboot mobile device before joining the video  4.       Come off Wi-Fi and switch over to Data    Please see our Video Visit Tip Sheet if you need additional assistance.     If you believe this is an emergency, please dial 911 immediately.                [facility]  [facility] [place]  [address], [facility] [address]  [facility] [address]  [phone]           Last A1c value was 8.3% done 5/19/2025.   Last office visit: 5/22/25 - CB  Last refill: 12/26/24 - CB

## 2025-07-21 RX ORDER — TIRZEPATIDE 5 MG/.5ML
5 INJECTION, SOLUTION SUBCUTANEOUS WEEKLY
Qty: 2 ML | Refills: 0 | Status: SHIPPED | OUTPATIENT
Start: 2025-07-21

## 2025-07-21 NOTE — TELEPHONE ENCOUNTER
Requested Prescriptions     Pending Prescriptions Disp Refills    Tirzepatide (MOUNJARO) 5 MG/0.5ML Subcutaneous Solution Auto-injector 2 mL 0     Sig: Inject 5 mg into the skin once a week.     Future Appointments   Date Time Provider Department Center   9/18/2025  7:30 AM Sofie Erickson APRN [facility]     Last A1c value was 8.3% done 5/19/2025.  Refill 05/28/25 [facility]   LOV 05/22/25 [facility]

## (undated) NOTE — LETTER
10/03/17        Camilo Robles  [address]      Dear Ellen Sweeney,    [address] [facility] records indicate that you have outstanding lab work and or testing that was ordered for you and has not yet been completed:          Microalb/Creat Ratio,

## (undated) NOTE — LETTER
06/05/19        Elisha Woodard  [address]      Dear Zuleyka Rogers,    [address] [facility] records indicate that you have outstanding lab work and or testing that was ordered for you and has not yet been completed:  Orders Placed This Encounter

## (undated) NOTE — LETTER
07/05/17        Michele Dale  [address] [facility]      Dear Horace Carrasco,    [address] [facility] records indicate that you have outstanding lab work and or testing that was ordered for you and has not yet been completed:          Microalb/Creat Ratio,

## (undated) NOTE — MR AVS SNAPSHOT
[facility] [address] [place] [address] [address] [facility]  [address] [id number] [phone]               Thank you for choosing us for your health care visit with Patricia Velazco MD.  We are glad to serve you and happy to provide you wit [employer] will allow you to access patient instructions from your recent visit,  view other health information, and more. To sign up or find more information, go to [email or web address] and click on the Sign Up Now link in the [employer] box.      Enter

## (undated) NOTE — MR AVS SNAPSHOT
[facility] [address] [place] [address] [address] [facility]  [address] [id number] [phone]               Thank you for choosing us for your health care visit with Stephanie Dozier MD.  We are glad to serve you and happy to provide you wit Current Medications          This list is accurate as of: 6/5/17  4:42 PM.  Always use your most recent med list.                Fish Oil 1200 MG Caps   Take by mouth. MetFORMIN HCl  MG Tb24   Take 1 tablet (500 mg total) by mouth daily. Tips for increasing your physical activity – Adults who are physically active are less likely to develop some chronic diseases than adults who are inactive.      HOW TO GET STARTED: HOW TO STAY MOTIVATED:   Start activities slowly and build up over time Do